# Patient Record
Sex: MALE | Race: WHITE | Employment: OTHER | URBAN - METROPOLITAN AREA
[De-identification: names, ages, dates, MRNs, and addresses within clinical notes are randomized per-mention and may not be internally consistent; named-entity substitution may affect disease eponyms.]

---

## 2021-09-09 ENCOUNTER — HOSPITAL ENCOUNTER (INPATIENT)
Age: 61
LOS: 5 days | Discharge: HOME | DRG: 308 | End: 2021-09-14
Attending: EMERGENCY MEDICINE | Admitting: FAMILY MEDICINE
Payer: MEDICARE

## 2021-09-09 ENCOUNTER — APPOINTMENT (OUTPATIENT)
Dept: CT IMAGING | Age: 61
DRG: 308 | End: 2021-09-09
Payer: MEDICARE

## 2021-09-09 ENCOUNTER — APPOINTMENT (OUTPATIENT)
Dept: GENERAL RADIOLOGY | Age: 61
DRG: 308 | End: 2021-09-09
Payer: MEDICARE

## 2021-09-09 DIAGNOSIS — R42 LIGHTHEADEDNESS: ICD-10-CM

## 2021-09-09 DIAGNOSIS — R11.2 NAUSEA AND VOMITING, INTRACTABILITY OF VOMITING NOT SPECIFIED, UNSPECIFIED VOMITING TYPE: ICD-10-CM

## 2021-09-09 DIAGNOSIS — I48.91 ATRIAL FIBRILLATION, UNSPECIFIED TYPE (HCC): Primary | ICD-10-CM

## 2021-09-09 LAB
ALBUMIN SERPL-MCNC: 4.4 G/DL (ref 3.5–5.2)
ALP BLD-CCNC: 86 U/L (ref 40–129)
ALT SERPL-CCNC: 23 U/L (ref 0–40)
ANION GAP SERPL CALCULATED.3IONS-SCNC: 17 MMOL/L (ref 7–16)
AST SERPL-CCNC: 26 U/L (ref 0–39)
BASOPHILS ABSOLUTE: 0.06 E9/L (ref 0–0.2)
BASOPHILS RELATIVE PERCENT: 0.3 % (ref 0–2)
BILIRUB SERPL-MCNC: 0.4 MG/DL (ref 0–1.2)
BUN BLDV-MCNC: 10 MG/DL (ref 6–23)
CALCIUM SERPL-MCNC: 9.4 MG/DL (ref 8.6–10.2)
CHLORIDE BLD-SCNC: 102 MMOL/L (ref 98–107)
CO2: 20 MMOL/L (ref 22–29)
CREAT SERPL-MCNC: 0.7 MG/DL (ref 0.7–1.2)
EOSINOPHILS ABSOLUTE: 0.03 E9/L (ref 0.05–0.5)
EOSINOPHILS RELATIVE PERCENT: 0.2 % (ref 0–6)
GFR AFRICAN AMERICAN: >60
GFR NON-AFRICAN AMERICAN: >60 ML/MIN/1.73
GLUCOSE BLD-MCNC: 165 MG/DL (ref 74–99)
HCT VFR BLD CALC: 43.7 % (ref 37–54)
HEMOGLOBIN: 15 G/DL (ref 12.5–16.5)
IMMATURE GRANULOCYTES #: 0.11 E9/L
IMMATURE GRANULOCYTES %: 0.6 % (ref 0–5)
LACTIC ACID: 2.2 MMOL/L (ref 0.5–2.2)
LYMPHOCYTES ABSOLUTE: 1.29 E9/L (ref 1.5–4)
LYMPHOCYTES RELATIVE PERCENT: 7.4 % (ref 20–42)
MCH RBC QN AUTO: 28.9 PG (ref 26–35)
MCHC RBC AUTO-ENTMCNC: 34.3 % (ref 32–34.5)
MCV RBC AUTO: 84.2 FL (ref 80–99.9)
MONOCYTES ABSOLUTE: 0.88 E9/L (ref 0.1–0.95)
MONOCYTES RELATIVE PERCENT: 5.1 % (ref 2–12)
NEUTROPHILS ABSOLUTE: 15.04 E9/L (ref 1.8–7.3)
NEUTROPHILS RELATIVE PERCENT: 86.4 % (ref 43–80)
PDW BLD-RTO: 12.9 FL (ref 11.5–15)
PLATELET # BLD: 273 E9/L (ref 130–450)
PMV BLD AUTO: 10.7 FL (ref 7–12)
POTASSIUM REFLEX MAGNESIUM: 4.1 MMOL/L (ref 3.5–5)
PRO-BNP: 61 PG/ML (ref 0–125)
RBC # BLD: 5.19 E12/L (ref 3.8–5.8)
REASON FOR REJECTION: NORMAL
REJECTED TEST: NORMAL
SARS-COV-2, NAAT: NOT DETECTED
SODIUM BLD-SCNC: 139 MMOL/L (ref 132–146)
TOTAL PROTEIN: 8.4 G/DL (ref 6.4–8.3)
TROPONIN, HIGH SENSITIVITY: 11 NG/L (ref 0–11)
TSH SERPL DL<=0.05 MIU/L-ACNC: 1.59 UIU/ML (ref 0.27–4.2)
WBC # BLD: 17.4 E9/L (ref 4.5–11.5)

## 2021-09-09 PROCEDURE — 96374 THER/PROPH/DIAG INJ IV PUSH: CPT

## 2021-09-09 PROCEDURE — 70450 CT HEAD/BRAIN W/O DYE: CPT

## 2021-09-09 PROCEDURE — 96375 TX/PRO/DX INJ NEW DRUG ADDON: CPT

## 2021-09-09 PROCEDURE — 84484 ASSAY OF TROPONIN QUANT: CPT

## 2021-09-09 PROCEDURE — 99285 EMERGENCY DEPT VISIT HI MDM: CPT

## 2021-09-09 PROCEDURE — 36415 COLL VENOUS BLD VENIPUNCTURE: CPT

## 2021-09-09 PROCEDURE — 6360000002 HC RX W HCPCS

## 2021-09-09 PROCEDURE — 74177 CT ABD & PELVIS W/CONTRAST: CPT

## 2021-09-09 PROCEDURE — 87635 SARS-COV-2 COVID-19 AMP PRB: CPT

## 2021-09-09 PROCEDURE — 2500000003 HC RX 250 WO HCPCS

## 2021-09-09 PROCEDURE — 71045 X-RAY EXAM CHEST 1 VIEW: CPT

## 2021-09-09 PROCEDURE — 84443 ASSAY THYROID STIM HORMONE: CPT

## 2021-09-09 PROCEDURE — 83605 ASSAY OF LACTIC ACID: CPT

## 2021-09-09 PROCEDURE — 96361 HYDRATE IV INFUSION ADD-ON: CPT

## 2021-09-09 PROCEDURE — 4A03X5D MEASUREMENT OF ARTERIAL FLOW, INTRACRANIAL, EXTERNAL APPROACH: ICD-10-PCS | Performed by: EMERGENCY MEDICINE

## 2021-09-09 PROCEDURE — 93005 ELECTROCARDIOGRAM TRACING: CPT | Performed by: EMERGENCY MEDICINE

## 2021-09-09 PROCEDURE — 96372 THER/PROPH/DIAG INJ SC/IM: CPT

## 2021-09-09 PROCEDURE — 6360000002 HC RX W HCPCS: Performed by: EMERGENCY MEDICINE

## 2021-09-09 PROCEDURE — 2580000003 HC RX 258: Performed by: RADIOLOGY

## 2021-09-09 PROCEDURE — 80053 COMPREHEN METABOLIC PANEL: CPT

## 2021-09-09 PROCEDURE — 83880 ASSAY OF NATRIURETIC PEPTIDE: CPT

## 2021-09-09 PROCEDURE — 2580000003 HC RX 258: Performed by: EMERGENCY MEDICINE

## 2021-09-09 PROCEDURE — 6360000004 HC RX CONTRAST MEDICATION: Performed by: RADIOLOGY

## 2021-09-09 PROCEDURE — 2140000000 HC CCU INTERMEDIATE R&B

## 2021-09-09 PROCEDURE — 96360 HYDRATION IV INFUSION INIT: CPT

## 2021-09-09 PROCEDURE — 85025 COMPLETE CBC W/AUTO DIFF WBC: CPT

## 2021-09-09 RX ORDER — 0.9 % SODIUM CHLORIDE 0.9 %
1000 INTRAVENOUS SOLUTION INTRAVENOUS ONCE
Status: COMPLETED | OUTPATIENT
Start: 2021-09-09 | End: 2021-09-09

## 2021-09-09 RX ORDER — DILTIAZEM HYDROCHLORIDE 5 MG/ML
20 INJECTION INTRAVENOUS ONCE
Status: COMPLETED | OUTPATIENT
Start: 2021-09-09 | End: 2021-09-09

## 2021-09-09 RX ORDER — PROMETHAZINE HYDROCHLORIDE 25 MG/ML
12.5 INJECTION, SOLUTION INTRAMUSCULAR; INTRAVENOUS ONCE
Status: COMPLETED | OUTPATIENT
Start: 2021-09-09 | End: 2021-09-09

## 2021-09-09 RX ORDER — PROMETHAZINE HYDROCHLORIDE 25 MG/ML
INJECTION, SOLUTION INTRAMUSCULAR; INTRAVENOUS
Status: DISCONTINUED
Start: 2021-09-09 | End: 2021-09-10

## 2021-09-09 RX ORDER — DILTIAZEM HYDROCHLORIDE 5 MG/ML
INJECTION INTRAVENOUS
Status: COMPLETED
Start: 2021-09-09 | End: 2021-09-09

## 2021-09-09 RX ORDER — LOSARTAN POTASSIUM 100 MG/1
TABLET ORAL
COMMUNITY
Start: 2021-06-25

## 2021-09-09 RX ORDER — HYDROCODONE BITARTRATE AND ACETAMINOPHEN 7.5; 325 MG/1; MG/1
TABLET ORAL
COMMUNITY
Start: 2021-08-29

## 2021-09-09 RX ORDER — ATORVASTATIN CALCIUM 20 MG/1
TABLET, FILM COATED ORAL
COMMUNITY
Start: 2021-08-10

## 2021-09-09 RX ORDER — SODIUM CHLORIDE 0.9 % (FLUSH) 0.9 %
10 SYRINGE (ML) INJECTION PRN
Status: COMPLETED | OUTPATIENT
Start: 2021-09-09 | End: 2021-09-09

## 2021-09-09 RX ORDER — ONDANSETRON 2 MG/ML
4 INJECTION INTRAMUSCULAR; INTRAVENOUS ONCE
Status: COMPLETED | OUTPATIENT
Start: 2021-09-09 | End: 2021-09-09

## 2021-09-09 RX ORDER — 0.9 % SODIUM CHLORIDE 0.9 %
1000 INTRAVENOUS SOLUTION INTRAVENOUS ONCE
Status: DISCONTINUED | OUTPATIENT
Start: 2021-09-09 | End: 2021-09-14 | Stop reason: HOSPADM

## 2021-09-09 RX ORDER — ONDANSETRON 2 MG/ML
INJECTION INTRAMUSCULAR; INTRAVENOUS
Status: COMPLETED
Start: 2021-09-09 | End: 2021-09-09

## 2021-09-09 RX ADMIN — IOPAMIDOL 90 ML: 755 INJECTION, SOLUTION INTRAVENOUS at 22:42

## 2021-09-09 RX ADMIN — ONDANSETRON 4 MG: 2 INJECTION INTRAMUSCULAR; INTRAVENOUS at 17:15

## 2021-09-09 RX ADMIN — Medication 10 ML: at 22:42

## 2021-09-09 RX ADMIN — PROMETHAZINE HYDROCHLORIDE 12.5 MG: 25 INJECTION INTRAMUSCULAR; INTRAVENOUS at 20:40

## 2021-09-09 RX ADMIN — DILTIAZEM HYDROCHLORIDE 20 MG: 5 INJECTION INTRAVENOUS at 17:13

## 2021-09-09 RX ADMIN — ONDANSETRON HYDROCHLORIDE 4 MG: 2 SOLUTION INTRAMUSCULAR; INTRAVENOUS at 17:15

## 2021-09-09 RX ADMIN — SODIUM CHLORIDE 1000 ML: 9 INJECTION, SOLUTION INTRAVENOUS at 17:45

## 2021-09-09 RX ADMIN — SODIUM CHLORIDE 1000 ML: 9 INJECTION, SOLUTION INTRAVENOUS at 22:30

## 2021-09-09 ASSESSMENT — ENCOUNTER SYMPTOMS
VOMITING: 1
ABDOMINAL PAIN: 0
DIARRHEA: 0
NAUSEA: 1
COUGH: 0
WHEEZING: 0
SHORTNESS OF BREATH: 0
SORE THROAT: 0
EYE PAIN: 0
PHOTOPHOBIA: 0
BACK PAIN: 0

## 2021-09-09 NOTE — ED NOTES
Bed: 01  Expected date:   Expected time:   Means of arrival:   Comments:  sindhu Rausch RN  09/09/21 3603

## 2021-09-09 NOTE — ED NOTES
IV infiltrated. ED attending at bedside and aware.  US guided IV needed per MD. Pt updated on plan for new IV insertion     Garrett Her RN  09/09/21 7139

## 2021-09-10 ENCOUNTER — APPOINTMENT (OUTPATIENT)
Dept: NUCLEAR MEDICINE | Age: 61
DRG: 308 | End: 2021-09-10
Payer: MEDICARE

## 2021-09-10 LAB
ALBUMIN SERPL-MCNC: 4 G/DL (ref 3.5–5.2)
ALP BLD-CCNC: 71 U/L (ref 40–129)
ALT SERPL-CCNC: 19 U/L (ref 0–40)
ANION GAP SERPL CALCULATED.3IONS-SCNC: 10 MMOL/L (ref 7–16)
AST SERPL-CCNC: 16 U/L (ref 0–39)
BILIRUB SERPL-MCNC: 0.4 MG/DL (ref 0–1.2)
BUN BLDV-MCNC: 10 MG/DL (ref 6–23)
CALCIUM SERPL-MCNC: 9.1 MG/DL (ref 8.6–10.2)
CHLORIDE BLD-SCNC: 101 MMOL/L (ref 98–107)
CHOLESTEROL, TOTAL: 134 MG/DL (ref 0–199)
CO2: 27 MMOL/L (ref 22–29)
CREAT SERPL-MCNC: 0.8 MG/DL (ref 0.7–1.2)
EKG ATRIAL RATE: 117 BPM
EKG ATRIAL RATE: 82 BPM
EKG P AXIS: 52 DEGREES
EKG P-R INTERVAL: 166 MS
EKG Q-T INTERVAL: 350 MS
EKG Q-T INTERVAL: 370 MS
EKG QRS DURATION: 70 MS
EKG QRS DURATION: 90 MS
EKG QTC CALCULATION (BAZETT): 432 MS
EKG QTC CALCULATION (BAZETT): 460 MS
EKG R AXIS: 29 DEGREES
EKG R AXIS: 34 DEGREES
EKG T AXIS: -7 DEGREES
EKG T AXIS: 24 DEGREES
EKG VENTRICULAR RATE: 104 BPM
EKG VENTRICULAR RATE: 82 BPM
GFR AFRICAN AMERICAN: >60
GFR NON-AFRICAN AMERICAN: >60 ML/MIN/1.73
GLUCOSE BLD-MCNC: 134 MG/DL (ref 74–99)
HCT VFR BLD CALC: 40 % (ref 37–54)
HDLC SERPL-MCNC: 34 MG/DL
HEMOGLOBIN: 13.6 G/DL (ref 12.5–16.5)
LDL CHOLESTEROL CALCULATED: 64 MG/DL (ref 0–99)
LV EF: 68 %
LVEF MODALITY: NORMAL
MCH RBC QN AUTO: 28.9 PG (ref 26–35)
MCHC RBC AUTO-ENTMCNC: 34 % (ref 32–34.5)
MCV RBC AUTO: 85.1 FL (ref 80–99.9)
METER GLUCOSE: 130 MG/DL (ref 74–99)
METER GLUCOSE: 135 MG/DL (ref 74–99)
METER GLUCOSE: 197 MG/DL (ref 74–99)
PDW BLD-RTO: 13.2 FL (ref 11.5–15)
PLATELET # BLD: 255 E9/L (ref 130–450)
PMV BLD AUTO: 11.1 FL (ref 7–12)
POTASSIUM REFLEX MAGNESIUM: 3.8 MMOL/L (ref 3.5–5)
RBC # BLD: 4.7 E12/L (ref 3.8–5.8)
SODIUM BLD-SCNC: 138 MMOL/L (ref 132–146)
TOTAL PROTEIN: 7.4 G/DL (ref 6.4–8.3)
TRIGL SERPL-MCNC: 180 MG/DL (ref 0–149)
VLDLC SERPL CALC-MCNC: 36 MG/DL
WBC # BLD: 13.9 E9/L (ref 4.5–11.5)

## 2021-09-10 PROCEDURE — 6360000002 HC RX W HCPCS: Performed by: NURSE PRACTITIONER

## 2021-09-10 PROCEDURE — 2140000000 HC CCU INTERMEDIATE R&B

## 2021-09-10 PROCEDURE — 6360000002 HC RX W HCPCS: Performed by: FAMILY MEDICINE

## 2021-09-10 PROCEDURE — 6370000000 HC RX 637 (ALT 250 FOR IP): Performed by: FAMILY MEDICINE

## 2021-09-10 PROCEDURE — 78452 HT MUSCLE IMAGE SPECT MULT: CPT

## 2021-09-10 PROCEDURE — 93016 CV STRESS TEST SUPVJ ONLY: CPT | Performed by: INTERNAL MEDICINE

## 2021-09-10 PROCEDURE — 93010 ELECTROCARDIOGRAM REPORT: CPT | Performed by: INTERNAL MEDICINE

## 2021-09-10 PROCEDURE — 82962 GLUCOSE BLOOD TEST: CPT

## 2021-09-10 PROCEDURE — 2580000003 HC RX 258: Performed by: FAMILY MEDICINE

## 2021-09-10 PROCEDURE — 80053 COMPREHEN METABOLIC PANEL: CPT

## 2021-09-10 PROCEDURE — 78452 HT MUSCLE IMAGE SPECT MULT: CPT | Performed by: INTERNAL MEDICINE

## 2021-09-10 PROCEDURE — 3430000000 HC RX DIAGNOSTIC RADIOPHARMACEUTICAL: Performed by: RADIOLOGY

## 2021-09-10 PROCEDURE — APPSS45 APP SPLIT SHARED TIME 31-45 MINUTES: Performed by: NURSE PRACTITIONER

## 2021-09-10 PROCEDURE — 93018 CV STRESS TEST I&R ONLY: CPT | Performed by: INTERNAL MEDICINE

## 2021-09-10 PROCEDURE — 6370000000 HC RX 637 (ALT 250 FOR IP): Performed by: NURSE PRACTITIONER

## 2021-09-10 PROCEDURE — 85027 COMPLETE CBC AUTOMATED: CPT

## 2021-09-10 PROCEDURE — 36415 COLL VENOUS BLD VENIPUNCTURE: CPT

## 2021-09-10 PROCEDURE — 80061 LIPID PANEL: CPT

## 2021-09-10 PROCEDURE — 93017 CV STRESS TEST TRACING ONLY: CPT

## 2021-09-10 PROCEDURE — A9500 TC99M SESTAMIBI: HCPCS | Performed by: RADIOLOGY

## 2021-09-10 RX ORDER — NICOTINE POLACRILEX 4 MG
15 LOZENGE BUCCAL PRN
Status: DISCONTINUED | OUTPATIENT
Start: 2021-09-10 | End: 2021-09-14 | Stop reason: HOSPADM

## 2021-09-10 RX ORDER — SODIUM CHLORIDE 9 MG/ML
25 INJECTION, SOLUTION INTRAVENOUS PRN
Status: DISCONTINUED | OUTPATIENT
Start: 2021-09-10 | End: 2021-09-14 | Stop reason: HOSPADM

## 2021-09-10 RX ORDER — POLYETHYLENE GLYCOL 3350 17 G/17G
17 POWDER, FOR SOLUTION ORAL DAILY PRN
Status: DISCONTINUED | OUTPATIENT
Start: 2021-09-10 | End: 2021-09-14 | Stop reason: HOSPADM

## 2021-09-10 RX ORDER — PROMETHAZINE HYDROCHLORIDE 25 MG/1
12.5 TABLET ORAL EVERY 6 HOURS PRN
Status: DISCONTINUED | OUTPATIENT
Start: 2021-09-10 | End: 2021-09-14 | Stop reason: HOSPADM

## 2021-09-10 RX ORDER — LOSARTAN POTASSIUM 50 MG/1
100 TABLET ORAL DAILY
Status: DISCONTINUED | OUTPATIENT
Start: 2021-09-10 | End: 2021-09-14 | Stop reason: HOSPADM

## 2021-09-10 RX ORDER — ACETAMINOPHEN 650 MG/1
650 SUPPOSITORY RECTAL EVERY 6 HOURS PRN
Status: DISCONTINUED | OUTPATIENT
Start: 2021-09-10 | End: 2021-09-14 | Stop reason: HOSPADM

## 2021-09-10 RX ORDER — ATORVASTATIN CALCIUM 20 MG/1
20 TABLET, FILM COATED ORAL NIGHTLY
Status: DISCONTINUED | OUTPATIENT
Start: 2021-09-10 | End: 2021-09-14 | Stop reason: HOSPADM

## 2021-09-10 RX ORDER — DEXTROSE MONOHYDRATE 25 G/50ML
12.5 INJECTION, SOLUTION INTRAVENOUS PRN
Status: DISCONTINUED | OUTPATIENT
Start: 2021-09-10 | End: 2021-09-14 | Stop reason: HOSPADM

## 2021-09-10 RX ORDER — DEXTROSE MONOHYDRATE 50 MG/ML
100 INJECTION, SOLUTION INTRAVENOUS PRN
Status: DISCONTINUED | OUTPATIENT
Start: 2021-09-10 | End: 2021-09-14 | Stop reason: HOSPADM

## 2021-09-10 RX ORDER — ACETAMINOPHEN 325 MG/1
650 TABLET ORAL EVERY 6 HOURS PRN
Status: DISCONTINUED | OUTPATIENT
Start: 2021-09-10 | End: 2021-09-14 | Stop reason: HOSPADM

## 2021-09-10 RX ORDER — SODIUM CHLORIDE 0.9 % (FLUSH) 0.9 %
10 SYRINGE (ML) INJECTION PRN
Status: DISCONTINUED | OUTPATIENT
Start: 2021-09-10 | End: 2021-09-14 | Stop reason: HOSPADM

## 2021-09-10 RX ORDER — ONDANSETRON 2 MG/ML
4 INJECTION INTRAMUSCULAR; INTRAVENOUS EVERY 6 HOURS PRN
Status: DISCONTINUED | OUTPATIENT
Start: 2021-09-10 | End: 2021-09-14 | Stop reason: HOSPADM

## 2021-09-10 RX ORDER — HYDROCODONE BITARTRATE AND ACETAMINOPHEN 7.5; 325 MG/1; MG/1
1 TABLET ORAL EVERY 4 HOURS PRN
Status: DISCONTINUED | OUTPATIENT
Start: 2021-09-10 | End: 2021-09-14 | Stop reason: HOSPADM

## 2021-09-10 RX ORDER — SODIUM CHLORIDE 0.9 % (FLUSH) 0.9 %
10 SYRINGE (ML) INJECTION EVERY 12 HOURS SCHEDULED
Status: DISCONTINUED | OUTPATIENT
Start: 2021-09-10 | End: 2021-09-14 | Stop reason: HOSPADM

## 2021-09-10 RX ADMIN — ENOXAPARIN SODIUM 100 MG: 100 INJECTION SUBCUTANEOUS at 08:20

## 2021-09-10 RX ADMIN — HYDROCODONE BITARTRATE AND ACETAMINOPHEN 1 TABLET: 7.5; 325 TABLET ORAL at 20:27

## 2021-09-10 RX ADMIN — Medication 10 ML: at 20:28

## 2021-09-10 RX ADMIN — ONDANSETRON 4 MG: 2 INJECTION INTRAMUSCULAR; INTRAVENOUS at 12:33

## 2021-09-10 RX ADMIN — LOSARTAN POTASSIUM 100 MG: 50 TABLET, FILM COATED ORAL at 14:29

## 2021-09-10 RX ADMIN — APIXABAN 5 MG: 5 TABLET, FILM COATED ORAL at 20:27

## 2021-09-10 RX ADMIN — Medication 11 MILLICURIE: at 10:53

## 2021-09-10 RX ADMIN — Medication 31 MILLICURIE: at 12:38

## 2021-09-10 RX ADMIN — Medication 10 ML: at 08:21

## 2021-09-10 RX ADMIN — SODIUM CHLORIDE, PRESERVATIVE FREE 10 ML: 5 INJECTION INTRAVENOUS at 04:44

## 2021-09-10 RX ADMIN — METOPROLOL TARTRATE 25 MG: 25 TABLET, FILM COATED ORAL at 14:29

## 2021-09-10 RX ADMIN — ONDANSETRON 4 MG: 2 INJECTION INTRAMUSCULAR; INTRAVENOUS at 04:43

## 2021-09-10 RX ADMIN — METOPROLOL TARTRATE 25 MG: 25 TABLET, FILM COATED ORAL at 20:27

## 2021-09-10 RX ADMIN — ATORVASTATIN CALCIUM 20 MG: 20 TABLET, FILM COATED ORAL at 20:27

## 2021-09-10 RX ADMIN — REGADENOSON 0.4 MG: 0.08 INJECTION, SOLUTION INTRAVENOUS at 12:26

## 2021-09-10 RX ADMIN — HYDROCODONE BITARTRATE AND ACETAMINOPHEN 1 TABLET: 7.5; 325 TABLET ORAL at 14:35

## 2021-09-10 ASSESSMENT — PAIN SCALES - GENERAL
PAINLEVEL_OUTOF10: 0
PAINLEVEL_OUTOF10: 5
PAINLEVEL_OUTOF10: 6
PAINLEVEL_OUTOF10: 0

## 2021-09-10 ASSESSMENT — PAIN DESCRIPTION - PAIN TYPE: TYPE: CHRONIC PAIN

## 2021-09-10 ASSESSMENT — PAIN DESCRIPTION - LOCATION: LOCATION: BACK

## 2021-09-10 NOTE — H&P
Hospitalist History & Physical      PCP: No primary care provider on file. Date of Admission: 9/9/2021    Date of Service: Pt seen/examined on 9/9/2021     Chief Complaint:  had concerns including Fatigue (per ems patient had near syncopal episode at home, ekg shows new onset afib rvr, patient denies pain). History Of Present Illness:    Mr. Damian Lacy, a 59y.o. year old male  who  has a past medical history of Diabetes mellitus ([address] Hyperlipidemia, and Hypertension. Patient presented to the emergency department for fatigue. Patient says he was at home when he had sudden onset of nausea and vomiting. He felt lightheaded. They called EMS. On arrival I found that he was tachycardic. EKG showed atrial fibrillation with RVR. Patient denied chest pain or shortness of breath. Denies fever, chills, cough, diarrhea, abdominal pain. On arrival to the emergency department patient's heart rate was in the 150s. He was given IV diltiazem and eventually converted to sinus rhythm. Knute Pro studies were unremarkable with the exception of WBC which is elevated at 17.4 this is likely reactive. Chest x-ray unremarkable. CT abdomen pelvis and head unremarkable. Past Medical History:   Diagnosis Date    Diabetes mellitus ([address]     Hyperlipidemia     Hypertension        History reviewed. No pertinent surgical history. Prior to Admission medications    Medication Sig Start Date End Date Taking?  Authorizing Provider   atorvastatin (LIPITOR) 20 MG tablet take 1 tablet by mouth once daily 8/10/21  Yes Historical Provider, MD   HYDROcodone-acetaminophen ([address]) 7.5-325 MG per tablet take 1 tablet by mouth every 6 hours if needed for pain 8/29/21  Yes Historical Provider, MD   losartan (COZAAR) 100 MG tablet take 1 tablet by mouth once daily 6/25/21  Yes Historical Provider, MD   metFORMIN (GLUCOPHAGE) 500 MG tablet take 1 tablet by mouth twice a day 6/25/21  Yes Historical Provider, MD Allergies:  Patient has no known allergies. Social History:    TOBACCO:   reports that he has been smoking cigarettes. He has never used smokeless tobacco.  ETOH:   reports previous alcohol use. Family History:    Reviewed in detail and negative for DM, CAD, Cancer, CVA. Positive as follows\"  History reviewed. No pertinent family history. REVIEW OF SYSTEMS:   Pertinent positives as noted in the HPI. All other systems reviewed and negative. PHYSICAL EXAM:  BP (!) 154/88   Pulse 87   Temp 98 °F (36.7 °C)   Resp 18   Ht 5' 6\" (1.676 m)   Wt 230 lb (104.3 kg)   SpO2 99%   BMI 37.12 kg/m²   General appearance: No apparent distress, appears stated age and cooperative. HEENT: Normal cephalic, atraumatic without obvious deformity. Pupils equal, round, and reactive to light. Extra ocular muscles intact. Conjunctivae/corneas clear. Neck: Supple, with full range of motion. No jugular venous distention. Trachea midline. Respiratory: CTA  Cardiovascular: RRR  Abdomen: Soft, nontender, nondistended  Musculoskeletal: No clubbing, cyanosis, edema of bilateral lower extremities. Brisk capillary refill. Skin: Normal skin color. No rashes or lesions. Neurologic:  Neurovascularly intact without any focal sensory/motor deficits. Cranial nerves: II-XII intact, grossly non-focal.      Reviewed EKG and CXR personally      CBC:   Recent Labs     09/09/21  2049   WBC 17.4*   RBC 5.19   HGB 15.0   HCT 43.7   MCV 84.2   RDW 12.9        BMP:   Recent Labs     09/09/21  1719      K 4.1      CO2 20*   BUN 10   CREATININE 0.7     LFT:  Recent Labs     09/09/21  1719   PROT 8.4*   ALKPHOS 86   ALT 23   AST 26   BILITOT 0.4     CE:  No results for input(s): Levan Pace in the last 72 hours. PT/INR: No results for input(s): INR, APTT in the last 72 hours. BNP: No results for input(s): BNP in the last 72 hours.   ESR: No results found for: SEDRATE  CRP: No results found for: CRP  D Dimer: No results found for: DDIMER   Folate and B12: No results found for: [id number], No results found for: FOLATE  Lactic Acid:   Lab Results   Component Value Date    LACTA 2.2 09/09/2021     Thyroid Studies:   Lab Results   Component Value Date    TSH 1.590 09/09/2021       Oupatient labs:  Lab Results   Component Value Date    TSH 1.590 09/09/2021       Urinalysis:  No results found for: Anand Hose, WBCUA, BACTERIA, RBCUA, BLOODU, SPECGRAV, GLUCOSEU    Imaging:  CT HEAD WO CONTRAST    Result Date: 9/9/2021  EXAMINATION: CT OF THE HEAD WITHOUT CONTRAST  9/9/2021 10:36 pm TECHNIQUE: CT of the head was performed without the administration of intravenous contrast. Dose modulation, iterative reconstruction, and/or weight based adjustment of the mA/kV was utilized to reduce the radiation dose to as low as reasonably achievable. COMPARISON: None. HISTORY: ORDERING SYSTEM PROVIDED HISTORY: nauses and vomitting TECHNOLOGIST PROVIDED HISTORY: Has a \"code stroke\" or \"stroke alert\" been called? ->No Reason for exam:->nauses and vomitting Decision Support Exception - unselect if not a suspected or confirmed emergency medical condition->Emergency Medical Condition (MA) What reading provider will be dictating this exam?->CRC FINDINGS: BRAIN/VENTRICLES: There is no acute intracranial hemorrhage, mass effect or midline shift. No abnormal extra-axial fluid collection. The gray-white differentiation is maintained without evidence of an acute infarct. There is prominence of the ventricles and sulci due to global parenchymal volume loss. There are nonspecific areas of hypoattenuation within the periventricular and subcortical white matter, which likely represent chronic microvascular ischemic change. Atherosclerosis. Asymmetrically small left intracranial internal carotid and middle cerebral arteries. Focal area of encephalomalacia and hypoattenuation in the left frontal insular region consistent with old infarct.   Resultant ex vacuo dilatation of the anterior left lateral ventricle. Tiny right caudate old lacunar infarct. ORBITS: The visualized portion of the orbits demonstrate no acute abnormality. SINUSES: Complete opacification of the left maxillary sinus with inspissated material and osseous thickening consistent with chronic disease. Otherwise, there is mild scattered paranasal sinus mucosal thickening. The bilateral mastoid air cells are clear. SOFT TISSUES/SKULL: No acute abnormality of the visualized skull or soft tissues. 1.  No acute intracranial abnormality. Specifically, no acute intracranial hemorrhage or mass effect. 2.  Old infarct in the left frontal insular region. 3.  Mild chronic small vessel ischemic disease. CT ABDOMEN PELVIS W IV CONTRAST Additional Contrast? None    Result Date: 9/9/2021  EXAMINATION: CT OF THE ABDOMEN AND PELVIS WITH CONTRAST 9/9/2021 10:36 pm TECHNIQUE: CT of the abdomen and pelvis was performed with the administration of intravenous contrast. Multiplanar reformatted images are provided for review. Dose modulation, iterative reconstruction, and/or weight based adjustment of the mA/kV was utilized to reduce the radiation dose to as low as reasonably achievable. COMPARISON: None. HISTORY: ORDERING SYSTEM PROVIDED HISTORY: nauses with vomitting TECHNOLOGIST PROVIDED HISTORY: Reason for exam:->nauses with vomitting Additional Contrast?->None Decision Support Exception - unselect if not a suspected or confirmed emergency medical condition->Emergency Medical Condition (MA) What reading provider will be dictating this exam?->CRC FINDINGS: Lower Chest: Visualized lungs, heart and pericardium are normal. Organs: Hepatic steatosis. The spleen, adrenal glands, kidneys, pancreas and gallbladder are unremarkable. GI/Bowel: Prominent submucosal colonic fat. Correlate with chronic colonic inflammatory process. The small bowel is unremarkable. Pelvis: Distended urinary bladder. Prostatomegaly. Peritoneum/Retroperitoneum: No free fluid or free air. Bones/Soft Tissues: Fat containing umbilical hernia. Degenerative changes thoracolumbar spine. No findings to explain the patient's symptoms. Hepatic steatosis. Mild prostatomegaly. XR CHEST PORTABLE    Result Date: 9/9/2021  EXAMINATION: ONE XRAY VIEW OF THE CHEST 9/9/2021 5:40 pm COMPARISON: None. HISTORY: ORDERING SYSTEM PROVIDED HISTORY: tachycardia TECHNOLOGIST PROVIDED HISTORY: Reason for exam:->tachycardia What reading provider will be dictating this exam?->CRC FINDINGS: Heart size is normal.  There are no infiltrates or effusions. Normal chest       ASSESSMENT:  -New onset atrial fibrillation with RVR  -Leukocytosis likely reactive  -Nausea vomiting, resolving  -Hypertension  -Hyperlipidemia  -Type 2 diabetes      PLAN:  -Admit to medicine  -Consult cardiology  -Lovenox 1 mg/kg twice daily  -Telemetry  -Continue home medications        Diet: No diet orders on file  Code Status: No Order  Surrogate decision maker confirmed with patient:   Extended Emergency Contact Information  Primary Emergency Contact: [address] [place] [address] Phone: [phone]  Relation: Spouse  Preferred language: English   needed? No    DVT Prophylaxis: []Lovenox []Heparin []PCD [] [address] []Encouraged ambulation  Disposition: []Med/Surg [] Intermediate [] ICU/CCU  Admit status: [] Observation [] Inpatient     +++++++++++++++++++++++++++++++++++++++++++++++++  Isael Trimble DO  [address]  +++++++++++++++++++++++++++++++++++++++++++++++++  NOTE: This report was transcribed using voice recognition software. Every effort was made to ensure accuracy; however, inadvertent computerized transcription errors may be present.

## 2021-09-10 NOTE — CARE COORDINATION
Care Coordination. Patient admitted  Following syncopal episode nausea vomiting and new on set Afib RVR. Patient currently undergoing stress test.  SW caled wife to assess for discharge planning. Patient lives with wife in own home in [place]. He is  retired secondary to back injury. Wife also retired and in good health. Patient was independent   He has no DME. No history with [facility] or home health care. PCP is Dr. Adenike Varela, pharmacy is [facility] in [facility]. DC plan is home  with no needs anticipated at this time. Wife will provide transport at discharge.

## 2021-09-10 NOTE — CONSULTS
Inpatient Cardiology Consultation      Reason for Consult:  Atrial fibrillation, new onset    Consulting Physician: Dr. Monica Zarco    Requesting Physician:  Dr. Linda Pratt    Date of Consultation: 9/10/2021    HISTORY OF PRESENT ILLNESS:       This 68-year-old male is new to [facility] cardiology and denies any cardiac history. He does admit to history of CVA, left carotid stenosis but was on no oral anticoagulation including aspirin. He was standing in the garage and suddenly felt as if he was going to fall over. He admits to presyncope but had no actual syncope. He had a spinning type sensation and was nauseated. He had continuous vomiting. He had no actual syncope or palpitations or chest pain. He even denies dyspnea. Paramedics found him to be in atrial fibrillation with rapid ventricular response. Blood pressure on admission was 145/97 and his heart rate was 152 he was afebrile and there was no hypoxia on room air. EKG on admission showed atrial fibrillation with rapid ventricular response and T wave abnormality with inferior ischemia. His heart rate was 117 bpm.    Sodium was 4.1 and magnesium was not done and the BUN was 10 with a creatinine of 0.7, BNP 61 and troponin 11, TSH 1.59, WBC 17.4 with an H&H of 15 and 43.  7, negative for COVID-19. Chest x-ray was read as normal, CT of the abdomen and pelvis showed hepatic steatosis and mild prostate megaly and fat-containing umbilical hernia, and CT of the head showed an old infarct in the left frontal lobe but no acute changes. He was treated with diltiazem 25 mg IV and given Zofran  and he was given fluid boluses. He converted around 11 PM and an EKG showed sinus rhythm. He has been started on Lovenox therapeutic dose. Past medical history  1. Obesity  2. Tobacco use  3. Hypertension  4. Hyperlipidemia  5. Diabetes, non-insulin-requiring  6. Chronic pain  7. Left carotid stenosis and CVA at [facility]  8.  Denies obstructive sleep apnea  9. History of back surgery, details unknown, positive for neuropathy, chronic Norco use  10. Father with premature coronary artery disease    Medications Prior to admit:  Prior to Admission medications    Medication Sig Start Date End Date Taking? Authorizing Provider   atorvastatin (LIPITOR) 20 MG tablet take 1 tablet by mouth once daily 8/10/21  Yes Historical Provider, MD   HYDROcodone-acetaminophen ([address]) 7.5-325 MG per tablet take 1 tablet by mouth every 6 hours if needed for pain 8/29/21  Yes Historical Provider, MD   losartan (COZAAR) 100 MG tablet take 1 tablet by mouth once daily 6/25/21  Yes Historical Provider, MD   metFORMIN (GLUCOPHAGE) 500 MG tablet take 1 tablet by mouth twice a day 6/25/21  Yes Historical Provider, MD       Current Medications:    Current Facility-Administered Medications: atorvastatin (LIPITOR) tablet 20 mg, 20 mg, Oral, Nightly  losartan (COZAAR) tablet 100 mg, 100 mg, Oral, Daily  metFORMIN (GLUCOPHAGE) tablet 500 mg, 500 mg, Oral, BID WC  sodium chloride flush 0.9 % injection 10 mL, 10 mL, IntraVENous, 2 times per day  sodium chloride flush 0.9 % injection 10 mL, 10 mL, IntraVENous, PRN  0.9 % sodium chloride infusion, 25 mL, IntraVENous, PRN  enoxaparin (LOVENOX) injection 100 mg, 1 mg/kg, SubCUTAneous, BID  promethazine (PHENERGAN) tablet 12.5 mg, 12.5 mg, Oral, Q6H PRN **OR** ondansetron (ZOFRAN) injection 4 mg, 4 mg, IntraVENous, Q6H PRN  polyethylene glycol (GLYCOLAX) packet 17 g, 17 g, Oral, Daily PRN  acetaminophen (TYLENOL) tablet 650 mg, 650 mg, Oral, Q6H PRN **OR** acetaminophen (TYLENOL) suppository 650 mg, 650 mg, Rectal, Q6H PRN  0.9 % sodium chloride bolus, 1,000 mL, IntraVENous, Once    Allergies:  Patient has no known allergies.     Social History: Current smoker approximately 5 to 8 cigarettes a day and is    No recent alcohol    Family History: Father with premature coronary artery disease at the age of 39 and mother had a stroke at age 80 when stress test is negative. Risks versus benefits of anticoagulation were discussed and he is willing to proceed. 4. Lopressor 25 mg twice a day  5. Orthostatic blood pressures  6. Counseled to stop smoking and lose weight  7. Recommend outpatient testing for obstructive sleep apnea   9/10/2021 at 7:39 AM     Patient seen and examined case discussed in detail with cardiology nurse practitioner and agree with assessment and plan and history and physical examination discussed in detail and documented above. Patient underwent stress test today which revealed no significant perfusion defect as shown below:    Lexiscan myocardial perfusion stress test September 10, 2021:  1.  ECG during the infusion did not change. 2.  The myocardial perfusion imaging was normal without ischemia or   infarct. 3.  Overall left ventricular systolic function was normal without   regional wall motion abnormalities. 4.  Low risk general pharmacologic stress test.       Thank you for sending your patient to this [address] [place] [address] [place], [place], [address] [facility] cardiology      Patient will need to follow-up with cardiology in the outpatient for further evaluation and management. Will sign off for now.

## 2021-09-10 NOTE — ED PROVIDER NOTES
HPI   Patient is a 59 y.o. male with a past medical history of diabetes, hyperlipidemia, hypertension, presenting to the Emergency Department for fatigue. History obtained by patient. Symptoms are moderate in severity and persistent since onset. They are improved by nothing and worsened by nothing. Patient presents emergency department for fatigue. He states he was at home and had sudden onset of nausea with vomiting. Nonbloody, nonbilious. He states he felt very lightheaded with this and felt like he was going to pass out. He did not pass out. He states his wife got home and saw him nauseous and vomiting so she called 911. He states he had to sit down because he was so lightheaded after vomiting. In route he was found to be tachycardic A. fib RVR. Patient denies any chest pain, shortness of breath, abdominal pain, diarrhea, constipation. He has no fevers, chills, no cough. He denies any numbness, tingling or weakness. Denies blurry vision or changes in vision. He states he was just very nauseous all of a sudden and started vomiting. He has never had a history of A. fib. He is not on anticoagulation. Denies recent sick contacts. Review of Systems   Constitutional: Negative for chills and fever. HENT: Negative for congestion and sore throat. Eyes: Negative for photophobia, pain and visual disturbance. Respiratory: Negative for cough, shortness of breath and wheezing. Cardiovascular: Negative for chest pain. Gastrointestinal: Positive for nausea and vomiting. Negative for abdominal pain and diarrhea. Genitourinary: Negative for dysuria and frequency. Musculoskeletal: Negative for arthralgias and back pain. Skin: Negative for rash and wound. Neurological: Positive for light-headedness. Negative for dizziness, syncope, speech difficulty, weakness, numbness and headaches. All other systems reviewed and are negative. Physical Exam  Vitals and nursing note reviewed. Constitutional:       General: He is not in acute distress. Appearance: He is well-developed. He is ill-appearing. HENT:      Head: Normocephalic and atraumatic. Mouth/Throat:      Mouth: Mucous membranes are dry. Eyes:      Extraocular Movements: Extraocular movements intact. Pupils: Pupils are equal, round, and reactive to light. Cardiovascular:      Rate and Rhythm: Tachycardia present. Rhythm irregular. Pulses: Normal pulses. Heart sounds: Normal heart sounds. No murmur heard. Pulmonary:      Effort: Pulmonary effort is normal. No respiratory distress. Breath sounds: Normal breath sounds. No wheezing or rales. Abdominal:      Palpations: Abdomen is soft. Tenderness: There is no abdominal tenderness. There is no right CVA tenderness, left CVA tenderness, guarding or rebound. Musculoskeletal:         General: No tenderness. Normal range of motion. Cervical back: Normal range of motion and neck supple. No tenderness. Right lower leg: No edema. Left lower leg: No edema. Skin:     General: Skin is warm and dry. Capillary Refill: Capillary refill takes less than 2 seconds. Neurological:      General: No focal deficit present. Mental Status: He is alert and oriented to person, place, and time. Cranial Nerves: No cranial nerve deficit. Sensory: No sensory deficit. Motor: No weakness. Coordination: Coordination normal.      Comments: No ataxia with finger to nose. No drift. Psychiatric:         Mood and Affect: Mood normal.         Behavior: Behavior normal.         Thought Content: Thought content normal.      NIH Stroke Scale/Score at time of initial evaluation:  1A: Level of Consciousness 0 - alert; keenly responsive   1B: Ask Month and Age 0 - answers both questions correctly   1C:  Tell Patient To Open and Close Eyes, then Hand  Squeeze 0 - performs both tasks correctly   2: Test Horizontal Extraocular Movements 0 - normal   3: Test Visual Fields 0 - no visual loss   4: Test Facial Palsy 0 - normal symmetric movement   5A: Test Left Arm Motor Drift 0 - no drift, limb holds 90 (or 45) degrees for full 10 seconds   5B: Test Right Arm Motor Drift 0 - no drift, limb holds 90 (or 45) degrees for full 10 seconds   6A: Test Left Leg Motor Drift 0 - no drift; leg holds 30 degree position for full 5 seconds   6B: Test Right Leg Motor Drift 0 - no drift; leg holds 30 degree position for full 5 seconds   7: Test Limb Ataxia   (FNF/Heel-Shin) 0 - absent   8: Test Sensation 0 - normal; no sensory loss   9: Test Language/Aphasia 0 - no aphasia, normal   10: Test Dysarthria 0 - normal   11: Test Extinction/Inattention 0 - no abnormality   Total Score: 0   On arrival        Procedures     MDM   Patient presented to the Emergency Department for nauseas and lightheadedness . They are clinically stable, tachycardic and nausous. afib rvr. No hx of. Pressure okay, cardizem given and rate improved. Patient neurovasc in tact. Supportive care given. Initial ddx included but not limited to afib rvr, infection, thyroid celso, cva, intraabdominal infection. Patient with no other complaints and reassuring physical exam, no pain, neurovasc intact. patient subsequently converted to sinus rhythm. Labs reassuring, does have leukocytosis but no other infectious symptoms on exam other than nausea. CT abdomen ordered and CT head also ordered as patient did have lightheaded ness. CT abdomen unrmearkable. CT head with prior stroke. Patient no hx of stroke and with new onset afib would benefit from admission and eval. Multiple repeat evals and patient symptomstically improving in the ED. Nausea and vomitting resolved. NIH remains 0 and neurovasc intact. Consult to on call hospitalist who will admit.      ED Course as of Sep 09 2358   Thu Sep 09, 2021   1841 Reevaluated,     [KP]   1235 Reevaluated patient updated about labs thus far awaiting head ct and abdomen CT. States he has a headache that started about 2hr prior but feels like a throb in front of his head. Nausea impvoed. No more episodes of vomitting. Repeat exam unchanged. Neurovasc in tact, no abdominal TTP, NIH0. Still finger to nose ataxia or drift. Has to go to restroom so walked patietn to bathroom. States he feels like headed with ambulation, no room spinning. Not ataxic on exam but walks slow 2/2 feeling lightheaded. Ted bryan [facility]    []      ED Course User Index  [] Maddie Giraldo-José Miguel, DO            ----------------------------------------------- PAST HISTORY --------------------------------------------  Past Medical History:  has a past medical history of Diabetes mellitus ([facility] [address] Hyperlipidemia, and Hypertension. Past Surgical History:  has no past surgical history on file. Social History:  reports that he has been smoking cigarettes. He has never used smokeless tobacco. He reports previous alcohol use. He reports that he does not use drugs. Family History: family history is not on file. The patients home medications have been reviewed. Allergies: Patient has no known allergies.     ------------------------------------------------ RESULTS ---------------------------------------------------    LABS:  Results for orders placed or performed during the hospital encounter of 09/09/21   COVID-19, Rapid    Specimen: Nasopharyngeal Swab   Result Value Ref Range    SARS-CoV-2, NAAT Not Detected Not Detected   Comprehensive Metabolic Panel w/ Reflex to MG   Result Value Ref Range    Sodium 139 132 - 146 mmol/L    Potassium reflex Magnesium 4.1 3.5 - 5.0 mmol/L    Chloride 102 98 - 107 mmol/L    CO2 20 (L) 22 - 29 mmol/L    Anion Gap 17 (H) 7 - 16 mmol/L    Glucose 165 (H) 74 - 99 mg/dL    BUN 10 6 - 23 mg/dL    CREATININE 0.7 0.7 - 1.2 mg/dL    GFR Non-African American >60 >=60 mL/min/1.73    GFR African American >60     Calcium 9.4 8.6 - 10.2 mg/dL    Total Protein 8.4 (H) 6.4 - 8.3 g/dL    Albumin 4.4 3.5 - 5.2 g/dL    Total Bilirubin 0.4 0.0 - 1.2 mg/dL    Alkaline Phosphatase 86 40 - 129 U/L    ALT 23 0 - 40 U/L    AST 26 0 - 39 U/L   Brain Natriuretic Peptide   Result Value Ref Range    Pro-BNP 61 0 - 125 pg/mL   Lactic Acid, Plasma   Result Value Ref Range    Lactic Acid 2.2 0.5 - 2.2 mmol/L   TSH without Reflex   Result Value Ref Range    TSH 1.590 0.270 - 4.200 uIU/mL   SPECIMEN REJECTION   Result Value Ref Range    Rejected Test cbcwd     Reason for Rejection see below    Troponin   Result Value Ref Range    Troponin, High Sensitivity 11 0 - 11 ng/L   CBC auto differential   Result Value Ref Range    WBC 17.4 (H) 4.5 - 11.5 E9/L    RBC 5.19 3.80 - 5.80 E12/L    Hemoglobin 15.0 12.5 - 16.5 g/dL    Hematocrit 43.7 37.0 - 54.0 %    MCV 84.2 80.0 - 99.9 fL    MCH 28.9 26.0 - 35.0 pg    MCHC 34.3 32.0 - 34.5 %    RDW 12.9 11.5 - 15.0 fL    Platelets 131 126 - 709 E9/L    MPV 10.7 7.0 - 12.0 fL    Neutrophils % 86.4 (H) 43.0 - 80.0 %    Immature Granulocytes % 0.6 0.0 - 5.0 %    Lymphocytes % 7.4 (L) 20.0 - 42.0 %    Monocytes % 5.1 2.0 - 12.0 %    Eosinophils % 0.2 0.0 - 6.0 %    Basophils % 0.3 0.0 - 2.0 %    Neutrophils Absolute 15.04 (H) 1.80 - 7.30 E9/L    Immature Granulocytes # 0.11 E9/L    Lymphocytes Absolute 1.29 (L) 1.50 - 4.00 E9/L    Monocytes Absolute 0.88 0.10 - 0.95 E9/L    Eosinophils Absolute 0.03 (L) 0.05 - 0.50 E9/L    Basophils Absolute 0.06 0.00 - 0.20 E9/L   EKG 12 Lead   Result Value Ref Range    Ventricular Rate 104 BPM    Atrial Rate 117 BPM    QRS Duration 90 ms    Q-T Interval 350 ms    QTc Calculation (Bazett) 460 ms    R Axis 34 degrees    T Axis -7 degrees   EKG 12 Lead   Result Value Ref Range    Ventricular Rate 82 BPM    Atrial Rate 82 BPM    P-R Interval 166 ms    QRS Duration 70 ms    Q-T Interval 370 ms    QTc Calculation (Bazett) 432 ms    P Axis 52 degrees    R Axis 29 degrees    T Axis 24 degrees       RADIOLOGY:    All Radiology results interpreted by Radiologist unless otherwise noted. CT ABDOMEN PELVIS W IV CONTRAST Additional Contrast? None   Final Result   No findings to explain the patient's symptoms. Hepatic steatosis. Mild prostatomegaly. CT HEAD WO CONTRAST   Final Result   1. No acute intracranial abnormality. Specifically, no acute intracranial   hemorrhage or mass effect. 2.  Old infarct in the left frontal insular region. 3.  Mild chronic small vessel ischemic disease. XR CHEST PORTABLE   Final Result   Normal chest             EKG:  This EKG is signed and interpreted by ED Physician. Time:  1653   Rate: 145  Rhythm: Atrial fibrillation. Interpretation: atrial fibrillation (new onset). No stemi. nonsepcfic st depression inferior leads. qtc 486  Comparison: changes compared to previous EKG. EKG: This EKG is signed and interpreted by ED Physician. Time:  1719   Rate: 104  Rhythm: Atrial fibrillation. Interpretation: atrial fibrillation (new onset). Normal axis, no stemi. qtc 460. t wave inversio nlead 3 and avf  Comparison: stable as compared to patient's most recent EKG, changes compared to previous EKG. EKG: This EKG is signed and interpreted by ED Physician. Time:  2047   Rate: 82  Rhythm: Sinus. Interpretation: no acute changes. No stemi. qtc 432, normal axis  Comparison: changes compared to previous EKG.      ---------------------------- NURSING NOTES AND VITALS REVIEWED -------------------------   The nursing notes within the ED encounter and vital signs as below have been reviewed.    BP (!) 154/88   Pulse 87   Temp 98 °F (36.7 °C)   Resp 18   Ht 5' 6\" (1.676 m)   Wt 230 lb (104.3 kg)   SpO2 99%   BMI 37.12 kg/m²   Oxygen Saturation Interpretation: Normal      ------------------------------------------PROGRESS NOTES -------------------------------------------    ED COURSE MEDICATIONS:                Medications   promethazine (PHENERGAN) 25 MG/ML injection (has no administration in time range)   0.9 % sodium chloride bolus (has no administration in time range)   dilTIAZem injection 20 mg (20 mg IntraVENous Given 9/9/21 1713)   ondansetron (ZOFRAN) injection 4 mg (4 mg IntraVENous Given 9/9/21 1715)   0.9 % sodium chloride bolus (0 mLs IntraVENous Stopped 9/9/21 2330)   promethazine (PHENERGAN) injection 12.5 mg (12.5 mg IntraMUSCular Given 9/9/21 2040)   0.9 % sodium chloride bolus (0 mLs IntraVENous Stopped 9/9/21 2331)   iopamidol (ISOVUE-370) 76 % injection 90 mL (90 mLs IntraVENous Given 9/9/21 2242)   sodium chloride flush 0.9 % injection 10 mL (10 mLs IntraVENous Given 9/9/21 2242)       CONSULTATIONS:            Consultation:  I Spoke with Dr. Radha Collier (Medicine). Discussed case. They will admit this patient. Keshawn Sallie PROCEDURES:            none. COUNSELING:   I have spoken with the patient and discussed todays results, in addition to providing specific details for the plan of care and counseling regarding the diagnosis and prognosis.     --------------------------------------- IMPRESSION & DISPOSITION --------------------------------     IMPRESSION(s):  1. Atrial fibrillation, unspecified type ([address]    2. Nausea and vomiting, intractability of vomiting not specified, unspecified vomiting type    3. Lightheadedness        This patient's ED course included: a personal history and physicial examination, re-evaluation prior to disposition, IV medications, cardiac monitoring and continuous pulse oximetry    This patient has been closely monitored during their ED course. DISPOSITION:  Disposition: Admit to telemetry. Patient condition is stable. END OF PROVIDER NOTE.            Lore Rivera DO  Resident  09/09/21 5983

## 2021-09-10 NOTE — PLAN OF CARE
Problem: Falls - Risk of:  Goal: Will remain free from falls  Description: Will remain free from falls  9/10/2021 1304 by Lola Hoskins RN  Outcome: Met This Shift

## 2021-09-10 NOTE — PROGRESS NOTES
Hospitalist Progress Note      Synopsis: Patient admitted on 9/9/2021     Subjective    Patient seen and examined. Chart reviewed, discussed with nursing staff and case management. No overnight events reported. Wife bedside. Asking for home norco for chronic back pain. Denies any current chest pain or dyspnea     Exam:  BP (!) 141/75   Pulse 82   Temp 96.8 °F (36 °C) (Temporal)   Resp 16   Ht 5' 6\" (1.676 m)   Wt 233 lb 6.4 oz (105.9 kg)   SpO2 98%   BMI 37.67 kg/m²   General appearance: No apparent distress, appears stated age and cooperative. HEENT: Pupils equal, round, and reactive to light. Conjunctivae/corneas clear. Neck: Supple. No jugular venous distention. Trachea midline. Respiratory:  Normal respiratory effort. Clear to auscultation, bilaterally without Rales/Wheezes/Rhonchi. Cardiovascular: Regular rate and rhythm with normal S1/S2 without murmurs, rubs or gallops. Abdomen: Soft, non-tender, non-distended with normal bowel sounds. Musculoskeletal: No clubbing, cyanosis or edema bilaterally. Brisk capillary refill. 2+ lower extremity pulses (dorsalis pedis).    Skin:  No rashes    Neurologic: awake, alert and following commands     Medications:  Reviewed    Infusion Medications    sodium chloride      dextrose       Scheduled Medications    atorvastatin  20 mg Oral Nightly    losartan  100 mg Oral Daily    sodium chloride flush  10 mL IntraVENous 2 times per day    enoxaparin  1 mg/kg SubCUTAneous BID    insulin lispro  0-6 Units SubCUTAneous TID WC    insulin lispro  0-3 Units SubCUTAneous Nightly    metoprolol tartrate  25 mg Oral BID    sodium chloride  1,000 mL IntraVENous Once     PRN Meds: sodium chloride flush, sodium chloride, promethazine **OR** ondansetron, polyethylene glycol, acetaminophen **OR** acetaminophen, glucose, dextrose, glucagon (rDNA), dextrose, perflutren lipid microspheres, HYDROcodone-acetaminophen    I/O    Intake/Output Summary (Last 24 hours) at 9/10/2021 1603  Last data filed at 9/10/2021 1323  Gross per 24 hour   Intake 130 ml   Output 300 ml   Net -170 ml       Labs:   Recent Labs     09/09/21  2049 09/10/21  0528   WBC 17.4* 13.9*   HGB 15.0 13.6   HCT 43.7 40.0    255       Recent Labs     09/09/21  1719 09/10/21  0528    138   K 4.1 3.8    101   CO2 20* 27   BUN 10 10   CREATININE 0.7 0.8   CALCIUM 9.4 9.1       Recent Labs     09/09/21  1719 09/10/21  0528   PROT 8.4* 7.4   ALKPHOS 86 71   ALT 23 19   AST 26 16   BILITOT 0.4 0.4       No results for input(s): INR in the last 72 hours. No results for input(s): Sammi Jaime in the last 72 hours. Chronic labs:  Lab Results   Component Value Date    CHOL 134 09/10/2021    TRIG 180 (H) 09/10/2021    HDL 34 09/10/2021    LDLCALC 64 09/10/2021    TSH 1.590 09/09/2021       Radiology:  Imaging studies reviewed today. ASSESSMENT:    Active Problems:    New onset a-fib ([address]  Resolved Problems:    * No resolved hospital problems. *    -New onset atrial fibrillation with RVR  -Leukocytosis likely reactive  -Nausea vomiting, resolving  -Hypertension  -Hyperlipidemia  -Type 2 diabetes  -CVA   -syncope     PLAN:  metoprolol. stress test and echo today . lovenox therapeutic dose, plan to switch to PO, need to check eliquis/xarelto pricing. orthostats . Needs sleep apnea study outpatient. Leukocytosis improving. afebrile. monitor Off abx      Diet: ADULT DIET; Regular  Code Status: Full Code  PT/OT Eval Status:     DVT Prophylaxis:     Recommended disposition at discharge:      +++++++++++++++++++++++++++++++++++++++++++++++++  Suze Cano MD   [facility].  +++++++++++++++++++++++++++++++++++++++++++++++++  NOTE: This report was transcribed using voice recognition software. Every effort was made to ensure accuracy; however, inadvertent computerized transcription errors may be present.

## 2021-09-10 NOTE — PATIENT CARE CONFERENCE
P Quality Flow/Interdisciplinary Rounds Progress Note        Quality Flow Rounds held on September 10, 2021    Disciplines Attending:  Bedside Nurse, ,  and Nursing Unit Leadership    Arlen Gudino was admitted on 9/9/2021  4:45 PM    Anticipated Discharge Date:  Expected Discharge Date: 09/12/21    Disposition:    Scotty Score:  Scotty Scale Score: 21    Readmission Risk              Risk of Unplanned Readmission:  8           Discussed patient goal for the day, patient clinical progression, and barriers to discharge.   The following Goal(s) of the Day/Commitment(s) have been identified:  Diagnostics - Report Results      Suzanna Doty RN  September 10, 2021

## 2021-09-10 NOTE — PROCEDURES
[facility] Nuclear Stress Test:    Cardiologist: Dr. Sg Vásquez EKG: NSR, normal EKG. Indications for study: Chest pain    1. No chest pain  2. No new arrhythmias  3. No EKG changes suggestive of stress induced ischemia  4. Nuclear images pending    Jennifer Warren MD., [facility].    [facility]) Cardiology

## 2021-09-11 ENCOUNTER — APPOINTMENT (OUTPATIENT)
Dept: CT IMAGING | Age: 61
DRG: 308 | End: 2021-09-11
Payer: MEDICARE

## 2021-09-11 LAB
ALBUMIN SERPL-MCNC: 4 G/DL (ref 3.5–5.2)
ALP BLD-CCNC: 69 U/L (ref 40–129)
ALT SERPL-CCNC: 18 U/L (ref 0–40)
ANION GAP SERPL CALCULATED.3IONS-SCNC: 11 MMOL/L (ref 7–16)
AST SERPL-CCNC: 17 U/L (ref 0–39)
BILIRUB SERPL-MCNC: 0.3 MG/DL (ref 0–1.2)
BUN BLDV-MCNC: 10 MG/DL (ref 6–23)
CALCIUM SERPL-MCNC: 9.1 MG/DL (ref 8.6–10.2)
CHLORIDE BLD-SCNC: 104 MMOL/L (ref 98–107)
CO2: 26 MMOL/L (ref 22–29)
CREAT SERPL-MCNC: 0.9 MG/DL (ref 0.7–1.2)
GFR AFRICAN AMERICAN: >60
GFR NON-AFRICAN AMERICAN: >60 ML/MIN/1.73
GLUCOSE BLD-MCNC: 143 MG/DL (ref 74–99)
HBA1C MFR BLD: 6.6 % (ref 4–5.6)
HCT VFR BLD CALC: 43.1 % (ref 37–54)
HEMOGLOBIN: 14.3 G/DL (ref 12.5–16.5)
MCH RBC QN AUTO: 28.7 PG (ref 26–35)
MCHC RBC AUTO-ENTMCNC: 33.2 % (ref 32–34.5)
MCV RBC AUTO: 86.4 FL (ref 80–99.9)
METER GLUCOSE: 103 MG/DL (ref 74–99)
METER GLUCOSE: 110 MG/DL (ref 74–99)
METER GLUCOSE: 135 MG/DL (ref 74–99)
METER GLUCOSE: 174 MG/DL (ref 74–99)
PDW BLD-RTO: 13.2 FL (ref 11.5–15)
PLATELET # BLD: 245 E9/L (ref 130–450)
PMV BLD AUTO: 10.8 FL (ref 7–12)
POTASSIUM REFLEX MAGNESIUM: 4.2 MMOL/L (ref 3.5–5)
RBC # BLD: 4.99 E12/L (ref 3.8–5.8)
SODIUM BLD-SCNC: 141 MMOL/L (ref 132–146)
TOTAL PROTEIN: 7.3 G/DL (ref 6.4–8.3)
WBC # BLD: 10.1 E9/L (ref 4.5–11.5)

## 2021-09-11 PROCEDURE — 2140000000 HC CCU INTERMEDIATE R&B

## 2021-09-11 PROCEDURE — 85027 COMPLETE CBC AUTOMATED: CPT

## 2021-09-11 PROCEDURE — 36415 COLL VENOUS BLD VENIPUNCTURE: CPT

## 2021-09-11 PROCEDURE — 6360000004 HC RX CONTRAST MEDICATION: Performed by: RADIOLOGY

## 2021-09-11 PROCEDURE — 82962 GLUCOSE BLOOD TEST: CPT

## 2021-09-11 PROCEDURE — 80053 COMPREHEN METABOLIC PANEL: CPT

## 2021-09-11 PROCEDURE — 83036 HEMOGLOBIN GLYCOSYLATED A1C: CPT

## 2021-09-11 PROCEDURE — 6370000000 HC RX 637 (ALT 250 FOR IP): Performed by: NURSE PRACTITIONER

## 2021-09-11 PROCEDURE — 2580000003 HC RX 258: Performed by: FAMILY MEDICINE

## 2021-09-11 PROCEDURE — 70496 CT ANGIOGRAPHY HEAD: CPT

## 2021-09-11 PROCEDURE — 99222 1ST HOSP IP/OBS MODERATE 55: CPT | Performed by: PSYCHIATRY & NEUROLOGY

## 2021-09-11 PROCEDURE — 6370000000 HC RX 637 (ALT 250 FOR IP): Performed by: FAMILY MEDICINE

## 2021-09-11 RX ORDER — SODIUM CHLORIDE 0.9 % (FLUSH) 0.9 %
10 SYRINGE (ML) INJECTION
Status: ACTIVE | OUTPATIENT
Start: 2021-09-11 | End: 2021-09-11

## 2021-09-11 RX ADMIN — LOSARTAN POTASSIUM 100 MG: 50 TABLET, FILM COATED ORAL at 08:57

## 2021-09-11 RX ADMIN — ATORVASTATIN CALCIUM 20 MG: 20 TABLET, FILM COATED ORAL at 20:47

## 2021-09-11 RX ADMIN — HYDROCODONE BITARTRATE AND ACETAMINOPHEN 1 TABLET: 7.5; 325 TABLET ORAL at 06:15

## 2021-09-11 RX ADMIN — HYDROCODONE BITARTRATE AND ACETAMINOPHEN 1 TABLET: 7.5; 325 TABLET ORAL at 16:28

## 2021-09-11 RX ADMIN — IOPAMIDOL 60 ML: 755 INJECTION, SOLUTION INTRAVENOUS at 14:14

## 2021-09-11 RX ADMIN — Medication 10 ML: at 20:47

## 2021-09-11 RX ADMIN — HYDROCODONE BITARTRATE AND ACETAMINOPHEN 1 TABLET: 7.5; 325 TABLET ORAL at 20:46

## 2021-09-11 RX ADMIN — HYDROCODONE BITARTRATE AND ACETAMINOPHEN 1 TABLET: 7.5; 325 TABLET ORAL at 12:15

## 2021-09-11 RX ADMIN — APIXABAN 5 MG: 5 TABLET, FILM COATED ORAL at 08:57

## 2021-09-11 RX ADMIN — APIXABAN 5 MG: 5 TABLET, FILM COATED ORAL at 20:47

## 2021-09-11 RX ADMIN — Medication 10 ML: at 08:25

## 2021-09-11 RX ADMIN — METOPROLOL TARTRATE 25 MG: 25 TABLET, FILM COATED ORAL at 20:47

## 2021-09-11 RX ADMIN — METOPROLOL TARTRATE 25 MG: 25 TABLET, FILM COATED ORAL at 08:57

## 2021-09-11 ASSESSMENT — PAIN SCALES - GENERAL
PAINLEVEL_OUTOF10: 6
PAINLEVEL_OUTOF10: 6
PAINLEVEL_OUTOF10: 7
PAINLEVEL_OUTOF10: 6
PAINLEVEL_OUTOF10: 4
PAINLEVEL_OUTOF10: 6

## 2021-09-11 ASSESSMENT — PAIN DESCRIPTION - LOCATION
LOCATION: BACK

## 2021-09-11 ASSESSMENT — PAIN DESCRIPTION - DESCRIPTORS: DESCRIPTORS: ACHING;DISCOMFORT;SORE

## 2021-09-11 ASSESSMENT — PAIN - FUNCTIONAL ASSESSMENT: PAIN_FUNCTIONAL_ASSESSMENT: PREVENTS OR INTERFERES SOME ACTIVE ACTIVITIES AND ADLS

## 2021-09-11 ASSESSMENT — PAIN DESCRIPTION - PROGRESSION: CLINICAL_PROGRESSION: NOT CHANGED

## 2021-09-11 ASSESSMENT — PAIN DESCRIPTION - ONSET: ONSET: ON-GOING

## 2021-09-11 ASSESSMENT — PAIN DESCRIPTION - PAIN TYPE
TYPE: CHRONIC PAIN

## 2021-09-11 ASSESSMENT — PAIN DESCRIPTION - FREQUENCY: FREQUENCY: CONTINUOUS

## 2021-09-11 ASSESSMENT — PAIN DESCRIPTION - ORIENTATION: ORIENTATION: MID;LOWER

## 2021-09-11 NOTE — PROGRESS NOTES
Spoke with neurology interventionalist regarding patient trying to leave AMA. Explained patient hasn't received results of CT scan. Dr said would be up to explain results.

## 2021-09-11 NOTE — PLAN OF CARE
Problem: Falls - Risk of:  Goal: Will remain free from falls  Description: Will remain free from falls  Outcome: Met This Shift     Problem: Falls - Risk of:  Goal: Absence of physical injury  Description: Absence of physical injury  Outcome: Met This Shift     Problem: Pain:  Goal: Pain level will decrease  Description: Pain level will decrease  Outcome: Ongoing     Problem: Pain:  Goal: Control of chronic pain  Description: Control of chronic pain  Outcome: Ongoing

## 2021-09-11 NOTE — PROGRESS NOTES
Went in patient's room for shift assessment and asked patient if any doctors had been by to see him. He responded with \"yes, that radiology doctor stopped by. \" This RN asked, Melva Vang did he have to say? \" Pt responded with \"he told me about my brain, but I've been aware of that for like 9 years. It's nothing new. He said he wasn't doing anything with it. What about Cardiology? I feel fine my heart is fine and I don't understand why I'm still here? What do I have to do to get out of here? \" This RN explained that all doctors, cardiology, neurology and attending need to agree that you are ok to discharge. Pt responded \"well can't I just leave? This RN responded that \"yes you have the right to leave, but it is against medical advice. \"  Pt continued \"well I just want to know what Cardiology has to say and if they will let me go. \"  This RN said she would reach out to cardiology.

## 2021-09-11 NOTE — PROGRESS NOTES
9/11- 2:20pm Patient was coming to MRI after his CT scan and refusing to come and exam. CT sent patient back to room- RN notified.

## 2021-09-11 NOTE — PROGRESS NOTES
This RN then reached out to Cardiology to address patient's questions whether or not they are ok with the patient discharging. This RN explained pt's reason for admission; including pt and his wife's concern today with pt \"still getting dizzy when standing up and moving? \" Also, explained that primary ordered MRI (pt refused) and CTA Head. Neuro Interventionalist notified pt and his wife of the results of the CTA. 's note includes that the left MCA branches appear diminutive which could be related to a cardiac etiology and this could be contributing to his orthostatic dizziness. Dr. Cisco Ferreira agrees with Interventionalist that pt is not ok to discharge from Cardiology standpoint. This RN notified patient.

## 2021-09-11 NOTE — PROGRESS NOTES
Brief note. A full consult note will follow. Consult received for abnormal CT    Did review pictures and spoke with RN. 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 I have also discussed with him that this is less likely the cause of his symptoms. Patient verbalizes understanding.     No plans for any intervention at this time    Can follow up with me in clinic

## 2021-09-11 NOTE — PROGRESS NOTES
[address] [facility] Neurology regarding conversation with pt. He said he wanted to see the results of pt's MRI, I explained that pt already refused MRI today. Also, that he still wants to leave; he doesn't understand how this affects his leaving. He repeatedly keeps asking how does this affect his heart and wants cardiology's input. Dr. ferguson keep it ordered. See if he will be amenable to it later. He will need to to assess for stroke.

## 2021-09-11 NOTE — PROGRESS NOTES
Hospitalist Progress Note      Synopsis: Patient admitted on 9/9/2021     Subjective    Patient seen and examined. Chart reviewed, discussed with nursing staff  Patient still complaining of dizziness and ataxia with ambulation. Exam:  /80   Pulse 76   Temp 96.9 °F (36.1 °C) (Temporal)   Resp 18   Ht 5' 6\" (1.676 m)   Wt 233 lb 6.4 oz (105.9 kg)   SpO2 96%   BMI 37.67 kg/m²   General appearance: No apparent distress, appears stated age and cooperative. HEENT: Pupils equal, round, and reactive to light. Conjunctivae/corneas clear. Neck: Supple. No jugular venous distention. Trachea midline. Respiratory:  Normal respiratory effort. Clear to auscultation, bilaterally without Rales/Wheezes/Rhonchi. Cardiovascular: Regular rate and rhythm with normal S1/S2 without murmurs, rubs or gallops. Abdomen: Soft, non-tender, non-distended with normal bowel sounds. Musculoskeletal: No clubbing, cyanosis or edema bilaterally. Brisk capillary refill. 2+ lower extremity pulses (dorsalis pedis).    Skin:  No rashes    Neurologic: awake, alert and following commands     Medications:  Reviewed    Infusion Medications    sodium chloride      dextrose       Scheduled Medications    atorvastatin  20 mg Oral Nightly    losartan  100 mg Oral Daily    sodium chloride flush  10 mL IntraVENous 2 times per day    insulin lispro  0-6 Units SubCUTAneous TID WC    insulin lispro  0-3 Units SubCUTAneous Nightly    metoprolol tartrate  25 mg Oral BID    apixaban  5 mg Oral BID    sodium chloride  1,000 mL IntraVENous Once     PRN Meds: sodium chloride flush, sodium chloride flush, sodium chloride, promethazine **OR** ondansetron, polyethylene glycol, acetaminophen **OR** acetaminophen, glucose, dextrose, glucagon (rDNA), dextrose, perflutren lipid microspheres, HYDROcodone-acetaminophen    I/O    Intake/Output Summary (Last 24 hours) at 9/11/2021 1432  Last data filed at 9/11/2021 1256  Gross per 24 hour   Intake 720 ml   Output --   Net 720 ml       Labs:   Recent Labs     09/09/21  2049 09/10/21  0528 09/11/21  0555   WBC 17.4* 13.9* 10.1   HGB 15.0 13.6 14.3   HCT 43.7 40.0 43.1    255 245       Recent Labs     09/09/21  1719 09/10/21  0528 09/11/21  0555    138 141   K 4.1 3.8 4.2    101 104   CO2 20* 27 26   BUN 10 10 10   CREATININE 0.7 0.8 0.9   CALCIUM 9.4 9.1 9.1       Recent Labs     09/09/21  1719 09/10/21  0528 09/11/21  0555   PROT 8.4* 7.4 7.3   ALKPHOS 86 71 69   ALT 23 19 18   AST 26 16 17   BILITOT 0.4 0.4 0.3       No results for input(s): INR in the last 72 hours. No results for input(s): Liberty Lowers in the last 72 hours. Chronic labs:  Lab Results   Component Value Date    CHOL 134 09/10/2021    TRIG 180 (H) 09/10/2021    HDL 34 09/10/2021    LDLCALC 64 09/10/2021    TSH 1.590 09/09/2021       Radiology:  Imaging studies reviewed today. Assessment and plan: Active Problems:    New onset a-fib [facility])  Resolved Problems:    * No resolved hospital problems. *    #New onset atrial fibrillation with RVR  -Normal sinus rhythm  -Resume metoprolol and Eliquis  -Cardiology following  -Negative nuclear stress test  -2D echo pending    #Dizziness/ataxia with nausea and vomiting  -Need to rule out brainstem or cerebellar infarct  -CT of the head without contrast unremarkable  -MRI of the brain ordered patient declined pending CTA of the head  -PT/OT evaluation  -LDL 64  -A1c  -Nausea and vomiting resolved  -Neurology consultation    #Leukocytosis likely reactive  -Resolved    #Hypertension   -Resume losartan    #Hyperlipidemia  -Resume statins    #Type 2 diabetes   -Metformin hold  -A1c               Diet: ADULT DIET;  Regular  Code Status: Full Code  PT/OT Eval Status:     DVT Prophylaxis:     Recommended disposition at discharge:      +++++++++++++++++++++++++++++++++++++++++++++++++  Etienne Damico MD   [facility].  +++++++++++++++++++++++++++++++++++++++++++++++++  NOTE: This report was transcribed using voice recognition software. Every effort was made to ensure accuracy; however, inadvertent computerized transcription errors may be present.

## 2021-09-11 NOTE — CONSULTS
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 Patient will follow up with me in clinic. Should he decide to get it treated at a later date it can be done electively with cardiac clearance once his cardiac issues have resolved    Patient reports he will call the clinic when he is ready. I have put my discharge information in his discharge paperwork    Patient reports that the left ICA occlusion is chronic and he has known about it for 9 years. No plans of intervention with this. He has already been started on anticoagulation for new onset A. fib          Condition-fair          Chief Complaint-orthostatic dizziness    HPI-  The patient is a 59 y.o. male with complaints of severe dizziness whenever he stands up. He was brought to the hospital by EMS and was noticed to have possible A. fib with rvr per run sheet. Objective      I/O last 3 completed shifts: In: 720 [P.O.:720]  Out: -   No intake/output data recorded.          Vitals:    09/11/21 0726   BP: 130/80   Pulse: 76   Resp: 18   Temp: 96.9 °F (36.1 °C)   SpO2: 96%         Physical Exam  CONST: Patient is alert oriented x3  ENMT: external ears normal to inspection and palpation, hearing   EYE: conjunctiva/corneas clear, PERRLA, EOMI  NECK: negative  CV: irregular  PUL: negative  ABD: Abdomen soft and nontender without distention, masses , no wound infection noted.  Musculoskeletal: no cyanosis, clubbing or edema present    Skin: No rashes or nodules noted. Neurologic exam    NIH Stroke Scale/Score at time of initial evaluation:    1A: Level of Consciousness 0 - alert; keenly responsive   1B: Ask Month and Age 0 - answers both questions correctly   1C:  Tell Patient To Open and Close Eyes, then Hand  Squeeze 0 - performs both tasks correctly   2: Test Horizontal Extraocular Movements 0 - normal   3: Test Visual Fields 0 - no visual loss   4: Test Facial Palsy 0 - normal symmetric movement   5A: Test Left Arm Motor Drift 0 - no drift, limb holds 90 (or 45) degrees for full 10 seconds   5B: Test Right Arm Motor Drift 0 - no drift, limb holds 90 (or 45) degrees for full 10 seconds   6A: Test Left Leg Motor Drift 0 - no drift; leg holds 30 degree position for full 5 seconds   6B: Test Right Leg Motor Drift 0 - no drift; leg holds 30 degree position for full 5 seconds   7: Test Limb Ataxia (FNF/Heel-Shin) 0 - absent   8: Test Sensation 0 - normal; no sensory loss   9: Test Language/Aphasia 0 - no aphasia, normal   10: Test Dysarthria 0 - normal   11: Test Extinction/Inattention 0 - no abnormality   Total 0                          Review of Systems-    Constitutional: Denies fever, chills, fatigue, and recent weight loss/gain  Eyes: Denies blurred vision, decreased vision, double vision and eye pain  ENT: Denies vertigo, hearing changes, and difficulty swallowing  Resp: Denies SOB, cough, sputum production, and hemoptysis  CV: Denies chest pain, claudication, irregular heart beat, lower extremity edema, palpitations and GONZALEZ  GI: Denies abdominal pain, n/v/d, constipation, and hematoemesis or hematochezia   : Denies dysuria, frequency, hematuria, incontinence and nocturnia  MS: Denies joint and muscle pain   Endocrine: Denies diabetes, thyroid disorder and adrenal dysfunction  Neurologic: Denies unilateral weakness             Imaging-Reviewed and my independent reviewed by me

## 2021-09-11 NOTE — PATIENT CARE CONFERENCE
[facility] Quality Flow/Interdisciplinary Rounds Progress Note        Quality Flow Rounds held on September 11, 2021    Disciplines Attending:  Bedside Nurse, ,  and Nursing Unit Leadership    Judi Diamond was admitted on 9/9/2021  4:45 PM    Anticipated Discharge Date:  Expected Discharge Date: 09/12/21    Disposition:    Scotty Score:  Sctoty Scale Score: 20    Readmission Risk              Risk of Unplanned Readmission:  9           Discussed patient goal for the day, patient clinical progression, and barriers to discharge.   The following Goal(s) of the Day/Commitment(s) have been identified:  Labs - Report Results      Mickey Mortensen RN  September 11, 2021

## 2021-09-11 NOTE — PROGRESS NOTES
Notified Primary of **Abnormal CT - CTA head: 5 x 3 mm posteriorly projecting aneurysm arising from the JIMI A2 segment before bifurcating into the distal inter hemispheric branches. Complete occlusion the visualized left cervical ICA, left petrous ICA, left cavernous and left supraclinoid ICA. Left MCA branches are significantly diminutive. Left JIMI A1 segment is hypoplastic and right JIMI A1 segment supplies bilateral JIMI A2 segment. Head CT: No evidence for acute intracranial hemorrhage, territorial infarction or intracranial mass lesion. Foci of chronic encephalomalacia as described. Mild chronic microangiopathic ischemic disease. Mild generalized volume loss. Severe mucosal disease of paranasal sinuses as described.

## 2021-09-12 ENCOUNTER — APPOINTMENT (OUTPATIENT)
Dept: MRI IMAGING | Age: 61
DRG: 308 | End: 2021-09-12
Payer: MEDICARE

## 2021-09-12 LAB
ALBUMIN SERPL-MCNC: 4 G/DL (ref 3.5–5.2)
ALP BLD-CCNC: 66 U/L (ref 40–129)
ALT SERPL-CCNC: 25 U/L (ref 0–40)
ANION GAP SERPL CALCULATED.3IONS-SCNC: 12 MMOL/L (ref 7–16)
AST SERPL-CCNC: 26 U/L (ref 0–39)
BILIRUB SERPL-MCNC: 0.4 MG/DL (ref 0–1.2)
BUN BLDV-MCNC: 10 MG/DL (ref 6–23)
CALCIUM SERPL-MCNC: 8.8 MG/DL (ref 8.6–10.2)
CHLORIDE BLD-SCNC: 100 MMOL/L (ref 98–107)
CO2: 25 MMOL/L (ref 22–29)
CREAT SERPL-MCNC: 0.8 MG/DL (ref 0.7–1.2)
GFR AFRICAN AMERICAN: >60
GFR NON-AFRICAN AMERICAN: >60 ML/MIN/1.73
GLUCOSE BLD-MCNC: 127 MG/DL (ref 74–99)
HCT VFR BLD CALC: 42.6 % (ref 37–54)
HEMOGLOBIN: 14.1 G/DL (ref 12.5–16.5)
MCH RBC QN AUTO: 28.1 PG (ref 26–35)
MCHC RBC AUTO-ENTMCNC: 33.1 % (ref 32–34.5)
MCV RBC AUTO: 84.9 FL (ref 80–99.9)
METER GLUCOSE: 106 MG/DL (ref 74–99)
METER GLUCOSE: 118 MG/DL (ref 74–99)
METER GLUCOSE: 150 MG/DL (ref 74–99)
METER GLUCOSE: 205 MG/DL (ref 74–99)
PDW BLD-RTO: 13 FL (ref 11.5–15)
PLATELET # BLD: 261 E9/L (ref 130–450)
PMV BLD AUTO: 11 FL (ref 7–12)
POTASSIUM REFLEX MAGNESIUM: 3.6 MMOL/L (ref 3.5–5)
RBC # BLD: 5.02 E12/L (ref 3.8–5.8)
SODIUM BLD-SCNC: 137 MMOL/L (ref 132–146)
TOTAL PROTEIN: 7.2 G/DL (ref 6.4–8.3)
WBC # BLD: 9.9 E9/L (ref 4.5–11.5)

## 2021-09-12 PROCEDURE — 2580000003 HC RX 258: Performed by: FAMILY MEDICINE

## 2021-09-12 PROCEDURE — 85027 COMPLETE CBC AUTOMATED: CPT

## 2021-09-12 PROCEDURE — 70551 MRI BRAIN STEM W/O DYE: CPT

## 2021-09-12 PROCEDURE — 6370000000 HC RX 637 (ALT 250 FOR IP): Performed by: INTERNAL MEDICINE

## 2021-09-12 PROCEDURE — 80053 COMPREHEN METABOLIC PANEL: CPT

## 2021-09-12 PROCEDURE — 6370000000 HC RX 637 (ALT 250 FOR IP): Performed by: NURSE PRACTITIONER

## 2021-09-12 PROCEDURE — 2140000000 HC CCU INTERMEDIATE R&B

## 2021-09-12 PROCEDURE — 6370000000 HC RX 637 (ALT 250 FOR IP): Performed by: FAMILY MEDICINE

## 2021-09-12 PROCEDURE — 82962 GLUCOSE BLOOD TEST: CPT

## 2021-09-12 PROCEDURE — 36415 COLL VENOUS BLD VENIPUNCTURE: CPT

## 2021-09-12 RX ORDER — DIAZEPAM 5 MG/1
5 TABLET ORAL ONCE
Status: COMPLETED | OUTPATIENT
Start: 2021-09-12 | End: 2021-09-12

## 2021-09-12 RX ADMIN — Medication 10 ML: at 20:26

## 2021-09-12 RX ADMIN — HYDROCODONE BITARTRATE AND ACETAMINOPHEN 1 TABLET: 7.5; 325 TABLET ORAL at 17:40

## 2021-09-12 RX ADMIN — HYDROCODONE BITARTRATE AND ACETAMINOPHEN 1 TABLET: 7.5; 325 TABLET ORAL at 21:48

## 2021-09-12 RX ADMIN — APIXABAN 5 MG: 5 TABLET, FILM COATED ORAL at 08:50

## 2021-09-12 RX ADMIN — INSULIN LISPRO 2 UNITS: 100 INJECTION, SOLUTION INTRAVENOUS; SUBCUTANEOUS at 13:05

## 2021-09-12 RX ADMIN — INSULIN LISPRO 1 UNITS: 100 INJECTION, SOLUTION INTRAVENOUS; SUBCUTANEOUS at 20:37

## 2021-09-12 RX ADMIN — DIAZEPAM 5 MG: 5 TABLET ORAL at 10:21

## 2021-09-12 RX ADMIN — METOPROLOL TARTRATE 25 MG: 25 TABLET, FILM COATED ORAL at 20:26

## 2021-09-12 RX ADMIN — Medication 10 ML: at 08:50

## 2021-09-12 RX ADMIN — HYDROCODONE BITARTRATE AND ACETAMINOPHEN 1 TABLET: 7.5; 325 TABLET ORAL at 13:05

## 2021-09-12 RX ADMIN — HYDROCODONE BITARTRATE AND ACETAMINOPHEN 1 TABLET: 7.5; 325 TABLET ORAL at 06:25

## 2021-09-12 RX ADMIN — HYDROCODONE BITARTRATE AND ACETAMINOPHEN 1 TABLET: 7.5; 325 TABLET ORAL at 01:12

## 2021-09-12 RX ADMIN — METOPROLOL TARTRATE 25 MG: 25 TABLET, FILM COATED ORAL at 08:50

## 2021-09-12 RX ADMIN — LOSARTAN POTASSIUM 100 MG: 50 TABLET, FILM COATED ORAL at 08:50

## 2021-09-12 RX ADMIN — ATORVASTATIN CALCIUM 20 MG: 20 TABLET, FILM COATED ORAL at 20:26

## 2021-09-12 RX ADMIN — APIXABAN 5 MG: 5 TABLET, FILM COATED ORAL at 20:26

## 2021-09-12 ASSESSMENT — PAIN DESCRIPTION - DESCRIPTORS
DESCRIPTORS: ACHING;DISCOMFORT;SORE
DESCRIPTORS: ACHING;DISCOMFORT;SHOOTING
DESCRIPTORS: ACHING;DISCOMFORT;SORE

## 2021-09-12 ASSESSMENT — PAIN DESCRIPTION - PROGRESSION: CLINICAL_PROGRESSION: NOT CHANGED

## 2021-09-12 ASSESSMENT — PAIN SCALES - GENERAL
PAINLEVEL_OUTOF10: 1
PAINLEVEL_OUTOF10: 6
PAINLEVEL_OUTOF10: 7
PAINLEVEL_OUTOF10: 7
PAINLEVEL_OUTOF10: 0
PAINLEVEL_OUTOF10: 2
PAINLEVEL_OUTOF10: 6
PAINLEVEL_OUTOF10: 8
PAINLEVEL_OUTOF10: 6

## 2021-09-12 ASSESSMENT — PAIN DESCRIPTION - LOCATION
LOCATION: BACK

## 2021-09-12 ASSESSMENT — PAIN DESCRIPTION - ONSET: ONSET: ON-GOING

## 2021-09-12 ASSESSMENT — PAIN DESCRIPTION - ORIENTATION
ORIENTATION: MID
ORIENTATION: MID;LOWER

## 2021-09-12 ASSESSMENT — PAIN DESCRIPTION - FREQUENCY
FREQUENCY: INTERMITTENT
FREQUENCY: CONTINUOUS

## 2021-09-12 ASSESSMENT — PAIN DESCRIPTION - PAIN TYPE
TYPE: CHRONIC PAIN

## 2021-09-12 ASSESSMENT — PAIN - FUNCTIONAL ASSESSMENT: PAIN_FUNCTIONAL_ASSESSMENT: PREVENTS OR INTERFERES SOME ACTIVE ACTIVITIES AND ADLS

## 2021-09-12 NOTE — PROGRESS NOTES
9/12- Called floor to check and see if MRI still needed or if patient is willing to try? Will await RN to call back.

## 2021-09-12 NOTE — PROGRESS NOTES
**OR** ondansetron, polyethylene glycol, acetaminophen **OR** acetaminophen, glucose, dextrose, glucagon (rDNA), dextrose, perflutren lipid microspheres, HYDROcodone-acetaminophen    I/O    Intake/Output Summary (Last 24 hours) at 9/12/2021 1311  Last data filed at 9/12/2021 0951  Gross per 24 hour   Intake 180 ml   Output --   Net 180 ml       Labs:   Recent Labs     09/10/21  0528 09/11/21  0555 09/12/21  0714   WBC 13.9* 10.1 9.9   HGB 13.6 14.3 14.1   HCT 40.0 43.1 42.6    245 261       Recent Labs     09/10/21  0528 09/11/21  0555 09/12/21  0714    141 137   K 3.8 4.2 3.6    104 100   CO2 27 26 25   BUN 10 10 10   CREATININE 0.8 0.9 0.8   CALCIUM 9.1 9.1 8.8       Recent Labs     09/10/21  0528 09/11/21  0555 09/12/21  0714   PROT 7.4 7.3 7.2   ALKPHOS 71 69 66   ALT 19 18 25   AST 16 17 26   BILITOT 0.4 0.3 0.4       No results for input(s): INR in the last 72 hours. No results for input(s): Connee Matar in the last 72 hours. Chronic labs:  Lab Results   Component Value Date    CHOL 134 09/10/2021    TRIG 180 (H) 09/10/2021    HDL 34 09/10/2021    LDLCALC 64 09/10/2021    TSH 1.590 09/09/2021    LABA1C 6.6 (H) 09/11/2021       Radiology:  Imaging studies reviewed today. Assessment and plan: Active Problems:    New onset a-fib [facility])  Resolved Problems:    * No resolved hospital problems. *    #Left cerebellar infarct most likely embolic from A. fib  -Patient presenting with dizziness/ataxia with nausea and vomiting  -Discussed with neurologist he recommended to continue Eliquis and repeat CTA of the head and neck tomorrow to make sure there is no hemorrhagic transformation  -PT/OT evaluation. -LDL 64.  -A1c 6.6.  -Neurology input appreciated.     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  -Neuro intervention following  -Outpatient follow-up was recommended      #New onset atrial fibrillation with RVR  -Normal sinus rhythm  -Resume metoprolol and Eliquis  -Cardiology following  -Negative nuclear stress test  -2D echo--pending report    #Leukocytosis likely reactive  -Resolved    #Hypertension   -Resume losartan    #Hyperlipidemia  -Resume statins    #Type 2 diabetes   -Controlled  -Metformin hold  -A1c 6.6               Diet: ADULT DIET; Regular; 3 carb choices (45 gm/meal)  Code Status: Full Code  PT/OT Eval Status:     DVT Prophylaxis:     Recommended disposition at discharge:      +++++++++++++++++++++++++++++++++++++++++++++++++  Kira Guzman MD   [facility].  +++++++++++++++++++++++++++++++++++++++++++++++++  NOTE: This report was transcribed using voice recognition software. Every effort was made to ensure accuracy; however, inadvertent computerized transcription errors may be present.

## 2021-09-12 NOTE — PLAN OF CARE
Notes reviewed.      MRI brain reviewed -- L cerebellar stroke most likely cardioembolism 2/2 new onset Afib    Discussed with Dr. Matt Miller for CTA neck  Continue Eliquis for now-- repeat CT head tomorrow to r/o hemorrhagic transformation   Continue statin     Bethany Mars PA-C

## 2021-09-12 NOTE — PATIENT CARE CONFERENCE
P Quality Flow/Interdisciplinary Rounds Progress Note        Quality Flow Rounds held on September 12, 2021    Disciplines Attending:  Bedside Nurse and Nursing Unit Leadership    Jessica Vidales was admitted on 9/9/2021  4:45 PM    Anticipated Discharge Date:  Expected Discharge Date: 09/12/21    Disposition:    Scotty Score:  Scotty Scale Score: 20    Readmission Risk              Risk of Unplanned Readmission:  9           Discussed patient goal for the day, patient clinical progression, and barriers to discharge.   The following Goal(s) of the Day/Commitment(s) have been identified:  get MRI completed      Alma Valdovinos RN  September 12, 2021

## 2021-09-13 ENCOUNTER — APPOINTMENT (OUTPATIENT)
Dept: CT IMAGING | Age: 61
DRG: 308 | End: 2021-09-13
Payer: MEDICARE

## 2021-09-13 LAB
ALBUMIN SERPL-MCNC: 4 G/DL (ref 3.5–5.2)
ALP BLD-CCNC: 72 U/L (ref 40–129)
ALT SERPL-CCNC: 37 U/L (ref 0–40)
ANION GAP SERPL CALCULATED.3IONS-SCNC: 11 MMOL/L (ref 7–16)
AST SERPL-CCNC: 35 U/L (ref 0–39)
BILIRUB SERPL-MCNC: 0.4 MG/DL (ref 0–1.2)
BUN BLDV-MCNC: 11 MG/DL (ref 6–23)
CALCIUM SERPL-MCNC: 8.9 MG/DL (ref 8.6–10.2)
CHLORIDE BLD-SCNC: 102 MMOL/L (ref 98–107)
CO2: 24 MMOL/L (ref 22–29)
CREAT SERPL-MCNC: 0.8 MG/DL (ref 0.7–1.2)
GFR AFRICAN AMERICAN: >60
GFR NON-AFRICAN AMERICAN: >60 ML/MIN/1.73
GLUCOSE BLD-MCNC: 130 MG/DL (ref 74–99)
HCT VFR BLD CALC: 42.8 % (ref 37–54)
HEMOGLOBIN: 14.5 G/DL (ref 12.5–16.5)
LV EF: 58 %
LVEF MODALITY: NORMAL
MCH RBC QN AUTO: 28.7 PG (ref 26–35)
MCHC RBC AUTO-ENTMCNC: 33.9 % (ref 32–34.5)
MCV RBC AUTO: 84.6 FL (ref 80–99.9)
METER GLUCOSE: 114 MG/DL (ref 74–99)
METER GLUCOSE: 125 MG/DL (ref 74–99)
METER GLUCOSE: 127 MG/DL (ref 74–99)
METER GLUCOSE: 128 MG/DL (ref 74–99)
PDW BLD-RTO: 12.8 FL (ref 11.5–15)
PLATELET # BLD: 250 E9/L (ref 130–450)
PMV BLD AUTO: 10.8 FL (ref 7–12)
POTASSIUM REFLEX MAGNESIUM: 3.8 MMOL/L (ref 3.5–5)
RBC # BLD: 5.06 E12/L (ref 3.8–5.8)
SODIUM BLD-SCNC: 137 MMOL/L (ref 132–146)
TOTAL PROTEIN: 7.6 G/DL (ref 6.4–8.3)
WBC # BLD: 9.6 E9/L (ref 4.5–11.5)

## 2021-09-13 PROCEDURE — 93306 TTE W/DOPPLER COMPLETE: CPT

## 2021-09-13 PROCEDURE — 82962 GLUCOSE BLOOD TEST: CPT

## 2021-09-13 PROCEDURE — 36415 COLL VENOUS BLD VENIPUNCTURE: CPT

## 2021-09-13 PROCEDURE — 97165 OT EVAL LOW COMPLEX 30 MIN: CPT

## 2021-09-13 PROCEDURE — 6360000004 HC RX CONTRAST MEDICATION: Performed by: RADIOLOGY

## 2021-09-13 PROCEDURE — 6370000000 HC RX 637 (ALT 250 FOR IP): Performed by: FAMILY MEDICINE

## 2021-09-13 PROCEDURE — 85027 COMPLETE CBC AUTOMATED: CPT

## 2021-09-13 PROCEDURE — 97535 SELF CARE MNGMENT TRAINING: CPT

## 2021-09-13 PROCEDURE — 99232 SBSQ HOSP IP/OBS MODERATE 35: CPT | Performed by: NURSE PRACTITIONER

## 2021-09-13 PROCEDURE — 2140000000 HC CCU INTERMEDIATE R&B

## 2021-09-13 PROCEDURE — 97161 PT EVAL LOW COMPLEX 20 MIN: CPT

## 2021-09-13 PROCEDURE — 70450 CT HEAD/BRAIN W/O DYE: CPT

## 2021-09-13 PROCEDURE — 97530 THERAPEUTIC ACTIVITIES: CPT

## 2021-09-13 PROCEDURE — 6370000000 HC RX 637 (ALT 250 FOR IP): Performed by: NURSE PRACTITIONER

## 2021-09-13 PROCEDURE — 2580000003 HC RX 258: Performed by: FAMILY MEDICINE

## 2021-09-13 PROCEDURE — 70498 CT ANGIOGRAPHY NECK: CPT

## 2021-09-13 PROCEDURE — 80053 COMPREHEN METABOLIC PANEL: CPT

## 2021-09-13 RX ORDER — SODIUM CHLORIDE 0.9 % (FLUSH) 0.9 %
10 SYRINGE (ML) INJECTION
Status: ACTIVE | OUTPATIENT
Start: 2021-09-13 | End: 2021-09-13

## 2021-09-13 RX ADMIN — LOSARTAN POTASSIUM 100 MG: 50 TABLET, FILM COATED ORAL at 08:30

## 2021-09-13 RX ADMIN — HYDROCODONE BITARTRATE AND ACETAMINOPHEN 1 TABLET: 7.5; 325 TABLET ORAL at 06:14

## 2021-09-13 RX ADMIN — HYDROCODONE BITARTRATE AND ACETAMINOPHEN 1 TABLET: 7.5; 325 TABLET ORAL at 10:48

## 2021-09-13 RX ADMIN — Medication 10 ML: at 20:52

## 2021-09-13 RX ADMIN — IOPAMIDOL 60 ML: 755 INJECTION, SOLUTION INTRAVENOUS at 12:48

## 2021-09-13 RX ADMIN — HYDROCODONE BITARTRATE AND ACETAMINOPHEN 1 TABLET: 7.5; 325 TABLET ORAL at 20:56

## 2021-09-13 RX ADMIN — ATORVASTATIN CALCIUM 20 MG: 20 TABLET, FILM COATED ORAL at 20:51

## 2021-09-13 RX ADMIN — APIXABAN 5 MG: 5 TABLET, FILM COATED ORAL at 08:30

## 2021-09-13 RX ADMIN — HYDROCODONE BITARTRATE AND ACETAMINOPHEN 1 TABLET: 7.5; 325 TABLET ORAL at 01:56

## 2021-09-13 RX ADMIN — METOPROLOL TARTRATE 25 MG: 25 TABLET, FILM COATED ORAL at 20:51

## 2021-09-13 RX ADMIN — APIXABAN 5 MG: 5 TABLET, FILM COATED ORAL at 20:51

## 2021-09-13 RX ADMIN — HYDROCODONE BITARTRATE AND ACETAMINOPHEN 1 TABLET: 7.5; 325 TABLET ORAL at 15:55

## 2021-09-13 RX ADMIN — METOPROLOL TARTRATE 25 MG: 25 TABLET, FILM COATED ORAL at 08:30

## 2021-09-13 RX ADMIN — Medication 10 ML: at 08:30

## 2021-09-13 ASSESSMENT — PAIN SCALES - GENERAL
PAINLEVEL_OUTOF10: 7
PAINLEVEL_OUTOF10: 6
PAINLEVEL_OUTOF10: 7
PAINLEVEL_OUTOF10: 6

## 2021-09-13 ASSESSMENT — PAIN DESCRIPTION - ONSET
ONSET: ON-GOING
ONSET: GRADUAL
ONSET: ON-GOING
ONSET: ON-GOING

## 2021-09-13 ASSESSMENT — PAIN DESCRIPTION - LOCATION
LOCATION: BACK

## 2021-09-13 ASSESSMENT — PAIN DESCRIPTION - FREQUENCY
FREQUENCY: INTERMITTENT

## 2021-09-13 ASSESSMENT — PAIN DESCRIPTION - PAIN TYPE
TYPE: CHRONIC PAIN

## 2021-09-13 ASSESSMENT — PAIN - FUNCTIONAL ASSESSMENT
PAIN_FUNCTIONAL_ASSESSMENT: ACTIVITIES ARE NOT PREVENTED
PAIN_FUNCTIONAL_ASSESSMENT: PREVENTS OR INTERFERES SOME ACTIVE ACTIVITIES AND ADLS
PAIN_FUNCTIONAL_ASSESSMENT: ACTIVITIES ARE NOT PREVENTED
PAIN_FUNCTIONAL_ASSESSMENT: ACTIVITIES ARE NOT PREVENTED

## 2021-09-13 ASSESSMENT — PAIN DESCRIPTION - DESCRIPTORS
DESCRIPTORS: ACHING;BURNING;CONSTANT
DESCRIPTORS: ACHING;DISCOMFORT;SORE
DESCRIPTORS: ACHING;BURNING;CONSTANT
DESCRIPTORS: ACHING;CONSTANT;CRAMPING

## 2021-09-13 ASSESSMENT — PAIN DESCRIPTION - PROGRESSION
CLINICAL_PROGRESSION: NOT CHANGED

## 2021-09-13 ASSESSMENT — PAIN DESCRIPTION - ORIENTATION
ORIENTATION: MID

## 2021-09-13 NOTE — PROGRESS NOTES
Stroke Education from [facility] given to patient. All questions answered at this time.        Daniela Hargrove RN

## 2021-09-13 NOTE — CARE COORDINATION
Met with pt at bedside today. Echo pending. PT/OT seen pt today, [facility] 18/24. Pt is independent in room. Pt anticipates no needs when medically cleared for discharge. Cm/ss will continue to follow.      Dean Calle

## 2021-09-13 NOTE — PROGRESS NOTES
Hospitalist Progress Note      Synopsis: Patient admitted on 9/9/2021 with new onset A. fib with rapid ventricular response associated with dizziness ataxia, nausea and vomiting. Stroke work-up showed evidence of a left cerebellar infarct. 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       Subjective    Patient seen and examined. Chart reviewed, discussed with nursing staff  Patient still complaining of dizziness. MRI showed evidence of left cerebellar infarct    Exam:  BP (!) 149/69   Pulse 73   Temp 97.6 °F (36.4 °C) (Temporal)   Resp 18   Ht 5' 6\" (1.676 m)   Wt 232 lb 4 oz (105.3 kg)   SpO2 96%   BMI 37.49 kg/m²   General appearance: No apparent distress, appears stated age and cooperative. HEENT: Pupils equal, round, and reactive to light. Conjunctivae/corneas clear. Neck: Supple. No jugular venous distention. Trachea midline. Respiratory:  Normal respiratory effort. Clear to auscultation, bilaterally without Rales/Wheezes/Rhonchi. Cardiovascular: Regular rate and rhythm with normal S1/S2 without murmurs, rubs or gallops. Abdomen: Soft, non-tender, non-distended with normal bowel sounds. Musculoskeletal: No clubbing, cyanosis or edema bilaterally. Brisk capillary refill. 2+ lower extremity pulses (dorsalis pedis).    Skin:  No rashes    Neurologic: awake, alert and following commands     Medications:  Reviewed    Infusion Medications    sodium chloride      dextrose       Scheduled Medications    atorvastatin  20 mg Oral Nightly    losartan  100 mg Oral Daily    sodium chloride flush  10 mL IntraVENous 2 times per day    insulin lispro  0-6 Units SubCUTAneous TID     insulin lispro  0-3 Units SubCUTAneous Nightly    metoprolol tartrate  25 mg Oral BID    apixaban  5 mg Oral BID    sodium chloride  1,000 mL IntraVENous Once     PRN Meds: sodium chloride flush, sodium chloride flush, sodium chloride, promethazine **OR** ondansetron, polyethylene glycol, acetaminophen **OR** acetaminophen, glucose, dextrose, glucagon (rDNA), dextrose, perflutren lipid microspheres, HYDROcodone-acetaminophen    I/O    Intake/Output Summary (Last 24 hours) at 9/13/2021 1315  Last data filed at 9/13/2021 0617  Gross per 24 hour   Intake 440 ml   Output 1150 ml   Net -710 ml       Labs:   Recent Labs     09/11/21 0555 09/12/21 0714 09/13/21  0744   WBC 10.1 9.9 9.6   HGB 14.3 14.1 14.5   HCT 43.1 42.6 42.8    261 250       Recent Labs     09/11/21 0555 09/12/21 0714 09/13/21  0744    137 137   K 4.2 3.6 3.8    100 102   CO2 26 25 24   BUN 10 10 11   CREATININE 0.9 0.8 0.8   CALCIUM 9.1 8.8 8.9       Recent Labs     09/11/21 0555 09/12/21 0714 09/13/21  0744   PROT 7.3 7.2 7.6   ALKPHOS 69 66 72   ALT 18 25 37   AST 17 26 35   BILITOT 0.3 0.4 0.4       No results for input(s): INR in the last 72 hours. No results for input(s): Yancy Dennis in the last 72 hours. Chronic labs:  Lab Results   Component Value Date    CHOL 134 09/10/2021    TRIG 180 (H) 09/10/2021    HDL 34 09/10/2021    LDLCALC 64 09/10/2021    TSH 1.590 09/09/2021    LABA1C 6.6 (H) 09/11/2021       Radiology:  Imaging studies reviewed today. Assessment and plan: Active Problems:    Atrial fibrillation ([address]  Resolved Problems:    * No resolved hospital problems. *    #Left cerebellar infarct most likely embolic from A. fib  -Patient presenting with dizziness/ataxia with nausea and vomiting  -Discussed with neurologist he recommended to continue Eliquis and repeat CTA of the head and neck today (pending)  -ECHO ordered and pending   -PT/OT evaluation. -LDL 64.  -A1c 6.6.  -Neurology input appreciated.     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  -Neuro intervention following  -Outpatient follow-up was recommended      #New onset atrial fibrillation with RVR  -Normal sinus rhythm  -Resume metoprolol and Eliquis  -Cardiology following  -Negative nuclear stress test  -2D echo--pending report    #Leukocytosis likely reactive  -Resolved    #Hypertension   -Resume losartan    #Hyperlipidemia  -Resume statins    #Type 2 diabetes   -Controlled  -Metformin hold  -A1c 6.6               Diet: Diet NPO  Code Status: Full Code  PT/OT Eval Status: ordered    DVT Prophylaxis:   eliquis  Recommended disposition at discharge:  home w outpatient therapy eval at dc     +++++++++++++++++++++++++++++++++++++++++++++++++  Osvaldo Lynne MD   [facility].  +++++++++++++++++++++++++++++++++++++++++++++++++  NOTE: This report was transcribed using voice recognition software. Every effort was made to ensure accuracy; however, inadvertent computerized transcription errors may be present.

## 2021-09-13 NOTE — PATIENT CARE CONFERENCE
P Quality Flow/Interdisciplinary Rounds Progress Note        Quality Flow Rounds held on September 13, 2021    Disciplines Attending:  Bedside Nurse, ,  and Nursing Unit Leadership    Sydney Long was admitted on 9/9/2021  4:45 PM    Anticipated Discharge Date:  Expected Discharge Date: 09/12/21    Disposition:    Scotty Score:  Scotty Scale Score: 19    Readmission Risk              Risk of Unplanned Readmission:  9           Discussed patient goal for the day, patient clinical progression, and barriers to discharge.   The following Goal(s) of the Day/Commitment(s) have been identified:  Diagnostics - Report Results      Denise Montgomery RN  September 13, 2021

## 2021-09-13 NOTE — PROGRESS NOTES
Miah Ocasio is a 59 y.o. right handed male     Neurology is following for acute stroke    PMH: Hypertension, hyperlipidemia, diabetes    Patient presented to ED on 9/9 for complaint of fatigue. Patient states he was at home and had sudden onset of nausea with vomiting. Patient also felt lightheaded after vomiting. In route patient was found to be tachycardic with A. fib with RVR. Dr. Thomas Linder was consulted on 9/11 for abnormal CT. Patient reported history of left ICA occlusion that had been chronic for 9 years. Left MCA branches appear diminutive which could be related to cardiac etiology which could be contributing to his orthostatic dizziness. Patient also found to have 5 mm JIMI aneurysm arising off the A2 segment. No intervention is planned at this time and patient is to follow in the clinic with Dr. Thomas Linder. MRI brain on 9/12 shows acute left cerebellar infarct with posteroinferior cerebellar artery occlusion. At present time patient rest quietly in bed with no complaints. There is no family present at bedside. Vital signs at present time are stable    Objective:     BP (!) 149/69   Pulse 73   Temp 97.6 °F (36.4 °C) (Temporal)   Resp 18   Ht 5' 6\" (1.676 m)   Wt 232 lb 4 oz (105.3 kg)   SpO2 96%   BMI 37.49 kg/m²     General appearance: alert, appears stated age, cooperative and no distress  Head: normocephalic, without obvious abnormality, atraumatic  Eyes: conjunctivae/corneas clear.  .  Neck: supple, symmetrical, trachea midline and thyroid not enlarged  Lungs: Unlabored breaths on room air  Heart: regular rate and rhythm  Extremities: normal, atraumatic, no cyanosis or edema  Pulses: 2+ and symmetric  Skin: color, texture, turgor normal---no rashes or lesions      Mental Status: Awake, alert and oriented x4, follows commands well, pleasant and comical    Appropriate attention/concentration  Intact fundus of knowledge  Intact memories    Speech: no dysarthria  Language: no aphasias    Cranial Nerves:  I: smell NA   II: visual acuity  NA   II: visual fields Full to confrontation   II: pupils PERRL   III,VII: ptosis None   III,IV,VI: extraocular muscles   EOMI without nystagmus   V: mastication Normal   V: facial light touch sensation  Normal   V,VII: corneal reflex     VII: facial muscle function - upper  Normal   VII: facial muscle function - lower L NLF flattening   VIII: hearing Normal   IX: soft palate elevation  Normal   IX,X: gag reflex    XI: trapezius strength  5/5   XI: sternocleidomastoid strength 5/5   XI: neck extension strength  5/5   XII: tongue strength  Normal     Motor:  Strong bilateral handgrips  5/5 throughout  Normal bulk and tone  No abnormal movements    Sensory:  LT intact throughout    Coordination:   FN, FFM and ADRIAN intact  HKS intact    Gait:  Not tested    No Babinskis  No Tompkins's    No pathological reflexes    Laboratory/Radiology:     CBC:   Lab Results   Component Value Date    WBC 9.6 09/13/2021    RBC 5.06 09/13/2021    HGB 14.5 09/13/2021    HCT 42.8 09/13/2021    MCV 84.6 09/13/2021    MCH 28.7 09/13/2021    MCHC 33.9 09/13/2021    RDW 12.8 09/13/2021     09/13/2021    MPV 10.8 09/13/2021     CMP:    Lab Results   Component Value Date     09/13/2021    K 3.8 09/13/2021     09/13/2021    CO2 24 09/13/2021    BUN 11 09/13/2021    CREATININE 0.8 09/13/2021    GFRAA >60 09/13/2021    LABGLOM >60 09/13/2021    GLUCOSE 130 09/13/2021    PROT 7.6 09/13/2021    LABALBU 4.0 09/13/2021    CALCIUM 8.9 09/13/2021    BILITOT 0.4 09/13/2021    ALKPHOS 72 09/13/2021    AST 35 09/13/2021    ALT 37 09/13/2021     Lab Results   Component Value Date    LABA1C 6.6 09/11/2021     FLP:    Lab Results   Component Value Date    TRIG 180 09/10/2021    HDL 34 09/10/2021    LDLCALC 64 09/10/2021    LABVLDL 36 09/10/2021     TSH:    Lab Results   Component Value Date    TSH 1.590 09/09/2021     MRI BRAIN WO CONTRAST   Final Result   Acute left XR CHEST PORTABLE   Final Result   Normal chest         CTA NECK W CONTRAST    (Results Pending)   CT HEAD WO CONTRAST    (Results Pending)       All labs and images personally reviewed today    Assessment:     Acute left posterioinferior cerebellar stroke secondary to artery occlusion   In the setting of new onset A. 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 Richard Maloney outpatient    Orthostatic dizziness   Likely in the setting of ICA occlusion superimposed on cardiac issues   Patient found to have diminutive left MCA on CTA   Adequate hydration and slow position changes     New onset A. Fib   Continue anticoagulation    Plan:     Continue supportive care  Repeat CT head and CTA neck with contrast pending  Complete echo pending  Continue Eliquis and statin  A1c 6.6, LDL 64  Continue PT/OT  Continue telemetry   Stroke education    Patient can follow up with Dr. Richard Maloney outpatient.      ROSEMARIE Perez - NP-C    9:49 AM  9/13/2021

## 2021-09-13 NOTE — PROGRESS NOTES
Physical Therapy  Physical Therapy Initial Assessment     Name: Anna Davila  : 1960  MRN: [id number]      Date of Service: 2021    Evaluating PT:  Christian Rose PT, DPT [id number]     Room #:  0960/4485-S  Diagnosis:  Lightheadedness [R42]  New onset a-fib ([address] [I48.91]  Atrial fibrillation, unspecified type ([address] [I48.91]  Nausea and vomiting, intractability of vomiting not specified, unspecified vomiting type [R11.2]  Reason for admission: dizziness, ataxia - L cerebellar infarct   Precautions:  Falls  Procedure/Surgery:  none  Equipment Recommendations:  none    SUBJECTIVE:  Pt lives with wife in a 2 story home with 4 ABY 1 rail - [address] Pt ambulated with no AD PTA. OBJECTIVE:   Initial Evaluation  Date:  Treatment   Short Term/ Long Term   Goals   AM-PAC 6 Clicks 65/31     Was pt agreeable to Eval/treatment? Yes      Does pt have pain?  6/10 back pain     Bed Mobility  Rolling: NT  Supine to sit: NT  Sit to supine: NT  Scooting: independent   Independent    Transfers Sit to stand: SBA  Stand to sit: SBA  Stand pivot: NT  Independent    Ambulation    100 feet with no AD SBA    >300 feet with no AD independent   Stair negotiation: ascended and descended  4 steps 1 rail SBA  >4 steps with 1 rail Mod I    ROM BUE:  See OT eval   BLE:  WFL     Strength BUE:  See OT eval   BLE:  knee ext 5/5  Ankle DF 5/5  Increase by 1/3 MMT grade    Balance Sitting EOB:  Independent  Dynamic Standing:  SBA  Sitting EOB:  Independent  Dynamic Standing:  Independent     -Pt is A & O x 4  -Sensation:  Pt denies numbness and tingling to extremities  -Edema:  unremarkable     Therapeutic Exercises:  Functional activity     Patient education  Pt educated on safety, sequencing of transfers, and role of PT    Patient response to education:   Pt verbalized understanding Pt demonstrated skill Pt requires further education in this area   Yes  Yes  Reinforce      ASSESSMENT:  Conditions Requiring Skilled Therapeutic Intervention:  []Decreased strength     []Decreased ROM  [x]Decreased functional mobility  [x]Decreased balance   [x]Decreased endurance   []Decreased posture  []Decreased sensation  []Decreased coordination   []Decreased vision  []Decreased safety awareness   []Increased pain       Comments:  Pt received sitting EOB and agreeable to PT session    Pt able to complete all transfers and mobility without hands on assistance. Verbal cueing and close standby for safety. Pt seems to have poor insight into situation but feels well and endorses no deficits. BLE strength testing normal. Ambulation completed without device showing fair speed with minor sway. Stair negotiation completed with use of railing and reciprocal pattern. Returned to room sitting EOB   Pt with all needs met and call light in reach. Pt would benefit from continued PT POC to address functional deficits described above. Treatment:  Patient practiced and was instructed in the following treatment:     Patient education provided continuously throughout session for sequencing, safety maintenance, and improving any deficits found during the evaluation.  Sitting EOB for >3 minutes for upright tolerance, postural awareness and BLE ROM    STS and pivot transfer training - pt educated on proper hand and foot placement, safety and sequencing, and use of proper technique to safely complete sit<>stand        Gait training- pt was given verbal and tactile cues to facilitate improved balance and endurance during ambulation     Stair training with close standby and verbal cueing for pattern and rail use    Pt's/ family goals   1. Return home     Patient and or family understand(s) diagnosis, prognosis, and plan of care. Yes     Prognosis is good for reaching above PT goals.     PHYSICAL THERAPY PLAN OF CARE:    PT POC is established based on physician order and patient diagnosis     Referring provider/PT Order:    09/13/21 0900  PT eval and treat     Vanesa Ebonie Leon MD     Diagnosis:  Lightheadedness [R42]  New onset a-fib ([facility] [I48.91]  Atrial fibrillation, unspecified type ([facility] [I48.91]  Nausea and vomiting, intractability of vomiting not specified, unspecified vomiting type [R11.2]  Specific instructions for next treatment:  Progress transfers and ambulation. Assess need to remain on caseload vs dc    Current Treatment Recommendations:   [] Strengthening to improve independence with functional mobility   [] ROM to improve independence with functional mobility   [x] Balance Training to improve static/dynamic balance and to reduce fall risk  [x] Endurance Training to improve activity tolerance during functional mobility   [x] Transfer Training to improve safety and independence with all functional transfers   [x] Gait Training to improve gait mechanics, endurance and asses need for appropriate assistive device  [x] Stair Training in preparation for safe discharge home and/or into the community   [] Positioning to prevent skin breakdown and contractures  [x] Safety and Education Training   [] Patient/Caregiver Education   [] HEP  [] Other     PT long term treatment goals are located in above grid    Frequency of treatments: 2-5x/week x 1-2 weeks. Time in  0740  Time out  0800    Total Treatment Time  8 minutes     Evaluation Time includes thorough review of current medical information, gathering information on past medical history/social history and prior level of function, completion of standardized testing/informal observation of tasks, assessment of data and education on plan of care and goals.     CPT codes:  [x] Low Complexity PT evaluation 36557  [] Moderate Complexity PT evaluation 16437  [] High Complexity PT evaluation 45275  [] PT Re-evaluation 26179  [] Gait training 79344 - minutes  [] Manual therapy 08303 - minutes  [x] Therapeutic activities 06206 8 minutes  [] Therapeutic exercises 60002 - minutes  [] Neuromuscular reeducation 14678 - minutes Yesenia Sneed, PT, DPT  [id number]

## 2021-09-13 NOTE — PROGRESS NOTES
Occupational Therapy  OCCUPATIONAL THERAPY INITIAL EVALUATION    [address] [address] [address]      Date:2021                                                Patient Name: Jennifer Santana  MRN: [id number]  : 1960  Room: [address] [place] [address]    Evaluating OT: Danita Seip OTR/L #[id number]     Referring Provider: Woody Pallas, MD  Specific Provider Orders/Date: OT eval and treat 21    Diagnosis: Lightheadedness [R42]  New onset a-fib ([facility] [I48.91]  Atrial fibrillation, unspecified type ([address] [I48.91]  Nausea and vomiting, intractability of vomiting not specified, unspecified vomiting type [R11.2]   Pt admitted to hospital with dizziness, ataxia.   + L cerebellar infarct     Pertinent Medical History:  has a past medical history of Diabetes mellitus ([facility] Hyperlipidemia, and Hypertension.        Precautions:  Fall Risk    Assessment of current deficits    [x] Functional mobility  [x]ADLs  [x] Strength               []Cognition    [x] Functional transfers   [x] IADLs         [x] Safety Awareness   [x]Endurance    [] Fine Coordination              [x] Balance      [] Vision/perception   []Sensation     []Gross Motor Coordination  [] ROM  [] Delirium                   [] Motor Control     OT PLAN OF CARE   OT POC based on physician orders, patient diagnosis and results of clinical assessment    Frequency/Duration 1-3 days/wk for 2 weeks PRN   Specific OT Treatment Interventions to include:   * Instruction/training on adapted ADL techniques and AE recommendations to increase functional independence within precautions       * Training on energy conservation strategies, correct breathing pattern and techniques to improve independence/tolerance for self-care routine  * Functional transfer/mobility training/DME recommendations for increased independence, safety, and fall prevention  * Patient/Family education to increase follow through with safety techniques and functional independence  * Recommendation of environmental modifications for increased safety with functional transfers/mobility and ADLs  * Therapeutic exercise to improve motor endurance, ROM, and functional strength for ADLs/functional transfers  * Therapeutic activities to facilitate/challenge dynamic balance, stand tolerance for increased safety and independence with ADLs    Recommended Adaptive Equipment:  TBD     Modified Porter Scale (MRS)  Score     Description  0             No symptoms  1             No significant disability despite symptoms  2             Slight disability; able to look after own affairs  3             Moderate disability; able to ambulate without assist/ requires assist with ADLs  4             Moderate/Severe disability;requires assist to ambulate/assist with ADLs  5             Severe disability;bedridden/incontinent   6               Score:  3    Home Living: Pt lives with wife in a 2 story home with 1st floor set-up; 4 ABY and 1 hand rail.     Bathroom setup: tub/shower combo    Equipment owned: cane, walker    Prior Level of Function: Independent with ADLs , Independent with IADLs; ambulated without AD   Driving: yes   Occupation: disability due to back injury    Pain Level: Pt denies pain this session    Cognition: A&O: 4/4; Follows 2 step directions   Memory:  good   Sequencing:  good   Problem solving:  good   Judgement/safety:  fair     Functional Assessment:  AM-PAC Daily Activity Raw Score:    Initial Eval Status  Date: 21 Treatment Status  Date: STGs = LTGs  Time frame: 10-14 days   Feeding Independent      Grooming Supervision    standing  Modified [place]    UB Dressing Independent       LB Dressing Stand by Assist   Modified [place]    Bathing Stand by Assist  Modified [place]    Toileting Stand by Assist   Modified [place]    Bed Mobility  Supine to sit: NT   Sit to supine: NT   Supine to sit: Modified [place] Sit to supine: Modified [place]    Functional Transfers Stand by Assist   Modified [place]    Functional Mobility Stand by Assist     Ambulated in room including to/from bathroom without AD  Modified [place]    Balance Sitting:     Static:  indep    Dynamic:indep  Standing: sup/sba     Activity Tolerance F  G   Visual/  Perceptual Glasses: reading  wfl                  Hand Dominance right   Strength ROM Additional Info:    RUE   4/5 wfl good  and wfl FMC/dexterity noted during ADL tasks     LUE 4/5 wfl good  and wfl FMC/dexterity noted during ADL tasks    + trigger finger   Finger to nose coordination intact  Hearing: wfl  Sensation:wfl  Tone: wfl  Edema:none noted     Comments: Upon arrival patient seated and agreeable to OT Session. Therapist educated pt on role of OT. At end of session, patient seated at EOB with call light and phone within reach. Overall patient demonstrated  decreased independence and safety during completion of ADL/functional transfer/mobility tasks. Pt would benefit from continued skilled OT to increase safety and independence with completion of ADL/IADL tasks for functional independence and quality of life. Treatment: OT treatment provided this date includes: Facilitation of bed mobility, functional transfers (various surfaces: bed, toilet), standing tolerance tasks (addressing posture, balance and activity tolerance while incorporating light functional reaching; impacting ADLs and functional activity) and functional ambulation tasks without AD (including to/ from bathroom and in preparation for item retrieval tasks; cuing on safety) - skilled cuing on hand placement, posture, body mechanics, energy conservation techniques and safety.   Therapist facilitated self-care retraining: UB/LB self-care tasks (socks, simulated gown), simulated toileting task and standing grooming tasks at sink while educating pt on modified techniques, posture, safety and energy conservation techniques. Skilled monitoring of HR, O2 sats and pts response to treatment. Rehab Potential: Good  for established goals     Patient / Family Goal: return home      Patient and/or family were instructed on functional diagnosis, prognosis/goals and OT plan of care. Demonstrated fair+ understanding. Eval Complexity: Low    Time In: 720  Time Out: 745  Total Treatment Time: 10 minutes    Min Units   OT Eval Low 97165  x  1   OT Eval Medium 07925      OT Eval High 19673      OT Re-Eval [id number]       Therapeutic Ex 46160       Therapeutic Activities 12740       ADL/Self Care 12076  8  1   Orthotic Management 94668  2     Manual 85930     Neuro Re-Ed 77882       Non-Billable Time          Evaluation Time additionally includes thorough review of current medical information, gathering information on past medical history/social history and prior level of function, interpretation of standardized testing/informal observation of tasks, assessment of data and development of plan of care and goals.           John Chiu OTR/L #[id number]

## 2021-09-13 NOTE — PLAN OF CARE
Problem: Falls - Risk of:  Goal: Will remain free from falls  Description: Will remain free from falls  Outcome: Met This Shift     Problem: Pain:  Goal: Control of chronic pain  Description: Control of chronic pain  Outcome: Met This Shift

## 2021-09-14 VITALS
DIASTOLIC BLOOD PRESSURE: 76 MMHG | OXYGEN SATURATION: 95 % | SYSTOLIC BLOOD PRESSURE: 142 MMHG | TEMPERATURE: 98 F | HEART RATE: 71 BPM | BODY MASS INDEX: 37.33 KG/M2 | HEIGHT: 66 IN | WEIGHT: 232.25 LBS | RESPIRATION RATE: 16 BRPM

## 2021-09-14 LAB
ALBUMIN SERPL-MCNC: 3.9 G/DL (ref 3.5–5.2)
ALP BLD-CCNC: 67 U/L (ref 40–129)
ALT SERPL-CCNC: 38 U/L (ref 0–40)
ANION GAP SERPL CALCULATED.3IONS-SCNC: 18 MMOL/L (ref 7–16)
AST SERPL-CCNC: 34 U/L (ref 0–39)
BILIRUB SERPL-MCNC: 0.4 MG/DL (ref 0–1.2)
BUN BLDV-MCNC: 11 MG/DL (ref 6–23)
CALCIUM SERPL-MCNC: 9.1 MG/DL (ref 8.6–10.2)
CHLORIDE BLD-SCNC: 103 MMOL/L (ref 98–107)
CO2: 18 MMOL/L (ref 22–29)
CREAT SERPL-MCNC: 0.7 MG/DL (ref 0.7–1.2)
GFR AFRICAN AMERICAN: >60
GFR NON-AFRICAN AMERICAN: >60 ML/MIN/1.73
GLUCOSE BLD-MCNC: 110 MG/DL (ref 74–99)
HCT VFR BLD CALC: 46.6 % (ref 37–54)
HEMOGLOBIN: 15.2 G/DL (ref 12.5–16.5)
MCH RBC QN AUTO: 28.3 PG (ref 26–35)
MCHC RBC AUTO-ENTMCNC: 32.6 % (ref 32–34.5)
MCV RBC AUTO: 86.8 FL (ref 80–99.9)
METER GLUCOSE: 117 MG/DL (ref 74–99)
METER GLUCOSE: 118 MG/DL (ref 74–99)
PDW BLD-RTO: 13 FL (ref 11.5–15)
PLATELET # BLD: 234 E9/L (ref 130–450)
PMV BLD AUTO: 11.5 FL (ref 7–12)
POTASSIUM REFLEX MAGNESIUM: 4.2 MMOL/L (ref 3.5–5)
RBC # BLD: 5.37 E12/L (ref 3.8–5.8)
SODIUM BLD-SCNC: 139 MMOL/L (ref 132–146)
TOTAL PROTEIN: 7.3 G/DL (ref 6.4–8.3)
WBC # BLD: 10.9 E9/L (ref 4.5–11.5)

## 2021-09-14 PROCEDURE — 82962 GLUCOSE BLOOD TEST: CPT

## 2021-09-14 PROCEDURE — 6370000000 HC RX 637 (ALT 250 FOR IP): Performed by: NURSE PRACTITIONER

## 2021-09-14 PROCEDURE — 80053 COMPREHEN METABOLIC PANEL: CPT

## 2021-09-14 PROCEDURE — 2580000003 HC RX 258: Performed by: FAMILY MEDICINE

## 2021-09-14 PROCEDURE — 36415 COLL VENOUS BLD VENIPUNCTURE: CPT

## 2021-09-14 PROCEDURE — 6370000000 HC RX 637 (ALT 250 FOR IP): Performed by: FAMILY MEDICINE

## 2021-09-14 PROCEDURE — 85027 COMPLETE CBC AUTOMATED: CPT

## 2021-09-14 RX ORDER — POLYETHYLENE GLYCOL 3350 17 G/17G
17 POWDER, FOR SOLUTION ORAL DAILY PRN
Qty: 527 G | Refills: 1 | Status: SHIPPED | OUTPATIENT
Start: 2021-09-14 | End: 2021-10-14

## 2021-09-14 RX ADMIN — METOPROLOL TARTRATE 25 MG: 25 TABLET, FILM COATED ORAL at 07:48

## 2021-09-14 RX ADMIN — HYDROCODONE BITARTRATE AND ACETAMINOPHEN 1 TABLET: 7.5; 325 TABLET ORAL at 10:55

## 2021-09-14 RX ADMIN — HYDROCODONE BITARTRATE AND ACETAMINOPHEN 1 TABLET: 7.5; 325 TABLET ORAL at 06:02

## 2021-09-14 RX ADMIN — APIXABAN 5 MG: 5 TABLET, FILM COATED ORAL at 07:48

## 2021-09-14 RX ADMIN — LOSARTAN POTASSIUM 100 MG: 50 TABLET, FILM COATED ORAL at 07:48

## 2021-09-14 RX ADMIN — Medication 10 ML: at 07:52

## 2021-09-14 ASSESSMENT — PAIN SCALES - GENERAL
PAINLEVEL_OUTOF10: 6
PAINLEVEL_OUTOF10: 7
PAINLEVEL_OUTOF10: 2

## 2021-09-14 NOTE — PATIENT CARE CONFERENCE
[facility] Quality Flow/Interdisciplinary Rounds Progress Note        Quality Flow Rounds held on September 14, 2021    Disciplines Attending:  Bedside Nurse, ,  and Nursing Unit Leadership    Anna Davila was admitted on 9/9/2021  4:45 PM    Anticipated Discharge Date:  Expected Discharge Date: 09/15/21    Disposition:    Scotty Score:  Scotty Scale Score: 19    Readmission Risk              Risk of Unplanned Readmission:  9           Discussed patient goal for the day, patient clinical progression, and barriers to discharge.   The following Goal(s) of the Day/Commitment(s) have been identified:  Labs - Report Results      Suellen Goodwin RN  September 14, 2021

## 2021-09-14 NOTE — PLAN OF CARE
Problem: Falls - Risk of:  Goal: Will remain free from falls  Description: Will remain free from falls  Outcome: Met This Shift     Problem: Falls - Risk of:  Goal: Absence of physical injury  Description: Absence of physical injury  Outcome: Met This Shift     Problem: Pain:  Goal: Pain level will decrease  Description: Pain level will decrease  Outcome: Met This Shift     Problem: Pain:  Goal: Control of acute pain  Description: Control of acute pain  Outcome: Met This Shift     Problem: Pain:  Goal: Control of chronic pain  Description: Control of chronic pain  Outcome: Met This Shift     Problem: Musculor/Skeletal Functional Status  Goal: Highest potential functional level  Outcome: Met This Shift     Problem: Musculor/Skeletal Functional Status  Goal: Absence of falls  Outcome: Met This Shift

## 2021-09-14 NOTE — CARE COORDINATION
SOCIAL WORK/CASEMANAGEMENT TRANSITION OF CARE [id number] [facility], [address], Tashia Timmons, [phone]): met with pt in the room this a.m. went over PT and OT eval and offered services and pt declined. Plan is home today, order is in the chart. NANDO Reagan  9/14/2021

## 2021-09-14 NOTE — DISCHARGE SUMMARY
Hospital Medicine Discharge Summary    Patient ID: Damian Cassidy      Patient's PCP: No primary care provider on file. Admit Date: 9/9/2021     Discharge Date:   9/14/2021    Admitting Physician: Angel Carroll DO     Discharge Physician: Juan Carlos Green MD     Discharge Diagnoses: Active Hospital Problems    Diagnosis Date Noted    Atrial fibrillation [facility]) [I48.91] 09/09/2021       The patient was seen and examined on day of discharge and this discharge summary is in conjunction with any daily progress note from day of discharge. Hospital Course:  Patient admitted on 9/9/2021 with new onset A. fib with rapid ventricular response associated with dizziness ataxia, nausea and vomiting. Stroke work-up showed evidence of a left cerebellar infarct. 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 Repeat CT scan without bleeding. Discussed with Dr. Arianna Marcum and pt cleared to discharge home. Pt ECHO done inpatient but shunt was not tested for so will need to be repeated outpatient. Discharged to home on 9/14/21 in stable condition with pcp follow up        Exam:     BP (!) 142/76   Pulse 71   Temp 98 °F (36.7 °C) (Infrared)   Resp 16   Ht 5' 6\" (1.676 m)   Wt 232 lb 4 oz (105.3 kg)   SpO2 95%   BMI 37.49 kg/m²     General appearance: No apparent distress, appears stated age and cooperative. HEENT: Pupils equal, round, and reactive to light. Conjunctivae/corneas clear. Neck: Supple. No jugular venous distention. Trachea midline. Respiratory:  Normal respiratory effort. Clear to auscultation, bilaterally without Rales/Wheezes/Rhonchi. Cardiovascular: Regular rate and rhythm with normal S1/S2 without murmurs, rubs or gallops. Abdomen: Soft, non-tender, non-distended with normal bowel sounds. Musculoskeletal: No clubbing, cyanosis or edema bilaterally. Brisk capillary refill.  2+ lower extremity pulses (dorsalis pedis). Skin:  No rashes    Neurologic: awake, alert and following commands; slowed responses with finger to nose testing    Consults:     IP CONSULT TO INTERNAL MEDICINE  IP CONSULT TO CARDIOLOGY  IP CONSULT TO NEUROLOGY  IP CONSULT TO INTERVENTIONAL RADIOLOGY    Significant Diagnostic Studies:     CTA NECK W CONTRAST   Final Result   1. 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 2. 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 3. Stenosis at the origin of the right vertebral artery that is difficult to   evaluate due to motion artifact. This could be greater than 50% but it is   difficult to be certain. 4. About 20% stenosis of the proximal right ICA by NASCET criteria. CT HEAD WO CONTRAST   Final Result   1. Stable subacute ischemia in the left cerebellum with no evidence of   hemorrhagic transformation. 2. Remote insult in the left frontal lobe. 3. Chronic small vessel ischemic disease. 4. Mucosal disease of the paranasal sinuses. MRI BRAIN WO CONTRAST   Final Result   Acute left posteroinferior cerebellar artery occlusion and associated acute   infarction. CTA HEAD W CONTRAST   Final Result      CTA head:      5 x 3 mm posteriorly projecting aneurysm arising from the JIMI A2 segment   before bifurcating into the distal inter hemispheric branches. Complete occlusion the visualized left cervical ICA, left petrous ICA, left   cavernous and left supraclinoid ICA. Left MCA branches are significantly   diminutive. Left JIMI A1 segment is hypoplastic and right JIMI A1 segment supplies   bilateral JIMI A2 segment. Head CT:      No evidence for acute intracranial hemorrhage, territorial infarction or   intracranial mass lesion. Foci of chronic encephalomalacia as described. Mild chronic microangiopathic ischemic disease. Mild generalized volume loss. Severe mucosal disease of paranasal sinuses as described.       The findings were sent to the Radiology Results [address] at 2:56   pm on 9/11/2021to be communicated to a licensed caregiver. [facility] Cardiac Stress Test Nuclear Imaging   Final Result   1. ECG during the infusion did not change. 2.  The myocardial perfusion imaging was normal without ischemia or   infarct. 3.  Overall left ventricular systolic function was normal without   regional wall motion abnormalities. 4. Low risk general pharmacologic stress test.      Thank you for sending your patient to this [employer]. Cecily Coleman MD, [address] cardiology          CT ABDOMEN PELVIS W IV CONTRAST Additional Contrast? None   Final Result   No findings to explain the patient's symptoms. Hepatic steatosis. Mild prostatomegaly. CT HEAD WO CONTRAST   Final Result   1. No acute intracranial abnormality. Specifically, no acute intracranial   hemorrhage or mass effect. 2.  Old infarct in the left frontal insular region. 3.  Mild chronic small vessel ischemic disease. XR CHEST PORTABLE   Final Result   Normal chest               Disposition:  home     Discharge Instructions/Follow-up:  Keep scheduled follow up appointments. Take medications as prescribed. Code Status:  Full Code     Activity: activity as tolerated    Diet: cardiac diet    Labs:  For convenience and continuity at follow-up the following most recent labs are provided:      CBC:    Lab Results   Component Value Date    WBC 10.9 09/14/2021    HGB 15.2 09/14/2021    HCT 46.6 09/14/2021     09/14/2021       Renal:    Lab Results   Component Value Date     09/14/2021    K 4.2 09/14/2021     09/14/2021    CO2 18 09/14/2021    BUN 11 09/14/2021    CREATININE 0.7 09/14/2021    CALCIUM 9.1 09/14/2021       Discharge Medications:     Current Discharge Medication List           Details   apixaban (ELIQUIS) 5 MG TABS tablet Take 1 tablet by mouth 2 times daily  Qty: 60 tablet, Refills: 0      metoprolol tartrate (LOPRESSOR) 25 MG tablet Take 1 tablet by mouth 2 times daily  Qty: 60 tablet, Refills: 3      polyethylene glycol (GLYCOLAX) 17 g packet Take 17 g by mouth daily as needed for Constipation  Qty: 527 g, Refills: 1              Details   atorvastatin (LIPITOR) 20 MG tablet take 1 tablet by mouth once daily      HYDROcodone-acetaminophen (NORCO) 7.5-325 MG per tablet take 1 tablet by mouth every 6 hours if needed for pain      losartan (COZAAR) 100 MG tablet take 1 tablet by mouth once daily      metFORMIN (GLUCOPHAGE) 500 MG tablet take 1 tablet by mouth twice a day             Time Spent on discharge is more than 45 minutes in the examination, evaluation, counseling and review of medications and discharge plan.       Signed:    Kathryn Osorio MD   9/14/2021

## 2021-09-14 NOTE — PROGRESS NOTES
Reviewed discharge instructions with patient and his spouse. Neither have any questions over instructions. Patient insisted on walking out of hospital with wife to PO.  Electronically signed by Arzella Carrel, RN on 9/14/21 at 12:53 PM EDT